# Patient Record
Sex: FEMALE | Race: WHITE | HISPANIC OR LATINO
[De-identification: names, ages, dates, MRNs, and addresses within clinical notes are randomized per-mention and may not be internally consistent; named-entity substitution may affect disease eponyms.]

---

## 2017-01-27 ENCOUNTER — APPOINTMENT (OUTPATIENT)
Dept: OTOLARYNGOLOGY | Facility: CLINIC | Age: 67
End: 2017-01-27

## 2017-01-27 VITALS
DIASTOLIC BLOOD PRESSURE: 73 MMHG | HEART RATE: 70 BPM | HEIGHT: 63 IN | BODY MASS INDEX: 27.82 KG/M2 | WEIGHT: 157 LBS | SYSTOLIC BLOOD PRESSURE: 137 MMHG

## 2017-01-27 RX ORDER — FLUTICASONE PROPIONATE 50 UG/1
50 SPRAY, METERED NASAL
Qty: 16 | Refills: 0 | Status: COMPLETED | COMMUNITY
Start: 2016-03-10

## 2017-03-10 ENCOUNTER — EMERGENCY (EMERGENCY)
Facility: HOSPITAL | Age: 67
LOS: 1 days | Discharge: HOME | End: 2017-03-10
Attending: EMERGENCY MEDICINE | Admitting: EMERGENCY MEDICINE
Payer: MEDICARE

## 2017-03-10 VITALS
RESPIRATION RATE: 18 BRPM | DIASTOLIC BLOOD PRESSURE: 73 MMHG | SYSTOLIC BLOOD PRESSURE: 164 MMHG | OXYGEN SATURATION: 98 % | HEART RATE: 77 BPM | TEMPERATURE: 98 F

## 2017-03-10 DIAGNOSIS — S05.01XA INJURY OF CONJUNCTIVA AND CORNEAL ABRASION WITHOUT FOREIGN BODY, RIGHT EYE, INITIAL ENCOUNTER: ICD-10-CM

## 2017-03-10 DIAGNOSIS — Z98.89 OTHER SPECIFIED POSTPROCEDURAL STATES: Chronic | ICD-10-CM
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
## 2017-03-10 DIAGNOSIS — J45.909 UNSPECIFIED ASTHMA, UNCOMPLICATED: ICD-10-CM

## 2017-03-10 DIAGNOSIS — S05.02XA INJURY OF CONJUNCTIVA AND CORNEAL ABRASION WITHOUT FOREIGN BODY, LEFT EYE, INITIAL ENCOUNTER: ICD-10-CM

## 2017-03-10 DIAGNOSIS — Z79.899 OTHER LONG TERM (CURRENT) DRUG THERAPY: ICD-10-CM
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
## 2017-03-10 DIAGNOSIS — E78.5 HYPERLIPIDEMIA, UNSPECIFIED: ICD-10-CM

## 2017-03-10 DIAGNOSIS — T26.61XA CORROSION OF CORNEA AND CONJUNCTIVAL SAC, RIGHT EYE, INITIAL ENCOUNTER: ICD-10-CM
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
## 2017-03-10 DIAGNOSIS — T26.62XA CORROSION OF CORNEA AND CONJUNCTIVAL SAC, LEFT EYE, INITIAL ENCOUNTER: ICD-10-CM

## 2017-03-10 DIAGNOSIS — H57.12 OCULAR PAIN, LEFT EYE: ICD-10-CM

## 2017-03-10 DIAGNOSIS — Z90.710 ACQUIRED ABSENCE OF BOTH CERVIX AND UTERUS: Chronic | ICD-10-CM

## 2017-03-10 PROCEDURE — 99284 EMERGENCY DEPT VISIT MOD MDM: CPT

## 2017-03-10 PROCEDURE — 99284 EMERGENCY DEPT VISIT MOD MDM: CPT | Mod: 25

## 2017-03-10 RX ORDER — IBUPROFEN 200 MG
1 TABLET ORAL
Qty: 30 | Refills: 0 | OUTPATIENT
Start: 2017-03-10

## 2017-03-10 RX ORDER — IBUPROFEN 200 MG
600 TABLET ORAL ONCE
Qty: 0 | Refills: 0 | Status: COMPLETED | OUTPATIENT
Start: 2017-03-10 | End: 2017-03-10

## 2017-03-10 RX ORDER — MOXIFLOXACIN HCL 0.5 %
1 DROPS OPHTHALMIC (EYE) ONCE
Qty: 0 | Refills: 0 | Status: DISCONTINUED | OUTPATIENT
Start: 2017-03-10 | End: 2017-03-10

## 2017-03-10 RX ORDER — MOXIFLOXACIN HCL 0.5 %
1 DROPS OPHTHALMIC (EYE)
Qty: 1 | Refills: 1 | OUTPATIENT
Start: 2017-03-10 | End: 2017-03-23

## 2017-03-10 RX ADMIN — Medication 600 MILLIGRAM(S): at 15:56

## 2017-03-10 RX ADMIN — Medication 600 MILLIGRAM(S): at 16:22

## 2017-03-10 NOTE — ED PROVIDER NOTE - PMH
Asthma    Breast cyst    Environmental allergies    GERD without esophagitis    Hyperlipidemia    Osteopenia    Prediabetes    Sinusitis

## 2017-03-10 NOTE — ED PROVIDER NOTE - PROGRESS NOTE DETAILS
Peter consulted. Pt discussed w Dr Horton - agrees w plan for abx drops (Vigamox tid), tetanus not necessary; to f/u w him on Monday.

## 2017-03-10 NOTE — ED PROVIDER NOTE - EYE EXAM METHOD
slit lamp/fluorescein/bilat eyes injected conjunctivae, + purulent drainage, R > L, eomi, no photophobia, no fb, bilat upper lids w erythema w/o ttp or warmth, R > L, lg corneal abrasion/fluorescein uptake R eye over almost entirety of iris area, L eye w uptake lower 1/3 of iris area

## 2017-03-10 NOTE — ED PROVIDER NOTE - OBJECTIVE STATEMENT
65 yo female h/o asthma c/o bilat eye pain, swelling and decreased vision R much greater than L after going to the gym today.  Pt used the equipment and then went to sauna then shower .  Pt denies fb sensation, fb but possibly got shampoo or conditioning products in the eye.  No trauma, no uri sx, + redness, swelling, drainage.  No recent travel, no sick contacts w similar.  No fever.

## 2017-03-10 NOTE — ED PROVIDER NOTE - CARE PLAN
Principal Discharge DX:	Corneal abrasion, unspecified laterality, initial encounter  Secondary Diagnosis:	Chemical conjunctivitis of both eyes

## 2017-03-10 NOTE — ED PROVIDER NOTE - MEDICAL DECISION MAKING DETAILS
Pt w conjunctivitis, corneal abrasion bilat and conjunctivitis, suspect 2/2 chemical from hair products and/or rubbing eyes.  Will discuss w ophtho - likely dc w pain meds, abx drops, and f/u eye.

## 2017-03-17 ENCOUNTER — APPOINTMENT (OUTPATIENT)
Dept: OPHTHALMOLOGY | Facility: CLINIC | Age: 67
End: 2017-03-17

## 2017-04-21 ENCOUNTER — APPOINTMENT (OUTPATIENT)
Dept: OTOLARYNGOLOGY | Facility: CLINIC | Age: 67
End: 2017-04-21

## 2017-04-21 VITALS
WEIGHT: 159 LBS | DIASTOLIC BLOOD PRESSURE: 66 MMHG | SYSTOLIC BLOOD PRESSURE: 130 MMHG | BODY MASS INDEX: 28.17 KG/M2 | HEIGHT: 63 IN | HEART RATE: 70 BPM

## 2017-04-21 RX ORDER — PREDNISONE 10 MG/1
10 TABLET ORAL DAILY
Qty: 18 | Refills: 0 | Status: COMPLETED | COMMUNITY
Start: 2017-01-27 | End: 2017-04-21

## 2017-05-04 ENCOUNTER — OUTPATIENT (OUTPATIENT)
Dept: OUTPATIENT SERVICES | Facility: HOSPITAL | Age: 67
LOS: 1 days | End: 2017-05-04
Payer: COMMERCIAL

## 2017-05-04 DIAGNOSIS — Z90.710 ACQUIRED ABSENCE OF BOTH CERVIX AND UTERUS: Chronic | ICD-10-CM

## 2017-05-04 DIAGNOSIS — Z98.89 OTHER SPECIFIED POSTPROCEDURAL STATES: Chronic | ICD-10-CM

## 2017-05-04 PROCEDURE — 70486 CT MAXILLOFACIAL W/O DYE: CPT

## 2017-05-04 PROCEDURE — 70486 CT MAXILLOFACIAL W/O DYE: CPT | Mod: 26

## 2017-06-07 ENCOUNTER — APPOINTMENT (OUTPATIENT)
Dept: OTOLARYNGOLOGY | Facility: CLINIC | Age: 67
End: 2017-06-07

## 2017-06-12 ENCOUNTER — APPOINTMENT (OUTPATIENT)
Dept: VASCULAR SURGERY | Facility: CLINIC | Age: 67
End: 2017-06-12

## 2017-06-30 ENCOUNTER — APPOINTMENT (OUTPATIENT)
Dept: VASCULAR SURGERY | Facility: CLINIC | Age: 67
End: 2017-06-30

## 2017-07-10 ENCOUNTER — APPOINTMENT (OUTPATIENT)
Dept: VASCULAR SURGERY | Facility: CLINIC | Age: 67
End: 2017-07-10

## 2017-07-17 VITALS
SYSTOLIC BLOOD PRESSURE: 160 MMHG | DIASTOLIC BLOOD PRESSURE: 69 MMHG | HEIGHT: 64 IN | HEART RATE: 65 BPM | WEIGHT: 160.06 LBS | OXYGEN SATURATION: 98 % | TEMPERATURE: 98 F | RESPIRATION RATE: 18 BRPM

## 2017-07-17 NOTE — ASU PATIENT PROFILE, ADULT - PMH
Asthma    Breast cyst    Environmental allergies    GERD without esophagitis    Hyperlipidemia    Prediabetes    Sinusitis

## 2017-07-17 NOTE — ASU PATIENT PROFILE, ADULT - REASON FOR ADMISSION, PROFILE
Bilateral endoscopic sinus surgery fess (bilateral) sphenoidectomy:maxillary antrostomy with removal of tissue

## 2017-08-02 ENCOUNTER — APPOINTMENT (OUTPATIENT)
Dept: OTOLARYNGOLOGY | Facility: HOSPITAL | Age: 67
End: 2017-08-02

## 2017-08-02 ENCOUNTER — OUTPATIENT (OUTPATIENT)
Dept: OUTPATIENT SERVICES | Facility: HOSPITAL | Age: 67
LOS: 1 days | Discharge: HOME | End: 2017-08-02

## 2017-08-02 DIAGNOSIS — Z98.89 OTHER SPECIFIED POSTPROCEDURAL STATES: Chronic | ICD-10-CM

## 2017-08-02 DIAGNOSIS — Z90.710 ACQUIRED ABSENCE OF BOTH CERVIX AND UTERUS: Chronic | ICD-10-CM

## 2017-09-08 RX ORDER — ALBUTEROL 90 UG/1
0 AEROSOL, METERED ORAL
Qty: 0 | Refills: 0 | COMMUNITY

## 2017-09-11 ENCOUNTER — APPOINTMENT (OUTPATIENT)
Dept: OTOLARYNGOLOGY | Facility: HOSPITAL | Age: 67
End: 2017-09-11

## 2017-09-11 ENCOUNTER — OUTPATIENT (OUTPATIENT)
Dept: OUTPATIENT SERVICES | Facility: HOSPITAL | Age: 67
LOS: 1 days | Discharge: HOME | End: 2017-09-11
Payer: MEDICARE

## 2017-09-11 ENCOUNTER — RESULT REVIEW (OUTPATIENT)
Age: 67
End: 2017-09-11

## 2017-09-11 VITALS
OXYGEN SATURATION: 98 % | DIASTOLIC BLOOD PRESSURE: 68 MMHG | TEMPERATURE: 99 F | HEART RATE: 72 BPM | SYSTOLIC BLOOD PRESSURE: 143 MMHG | RESPIRATION RATE: 14 BRPM

## 2017-09-11 DIAGNOSIS — J32.2 CHRONIC ETHMOIDAL SINUSITIS: ICD-10-CM

## 2017-09-11 DIAGNOSIS — K21.9 GASTRO-ESOPHAGEAL REFLUX DISEASE WITHOUT ESOPHAGITIS: ICD-10-CM

## 2017-09-11 DIAGNOSIS — J33.8 OTHER POLYP OF SINUS: ICD-10-CM

## 2017-09-11 DIAGNOSIS — Z90.710 ACQUIRED ABSENCE OF BOTH CERVIX AND UTERUS: Chronic | ICD-10-CM

## 2017-09-11 DIAGNOSIS — N60.09 SOLITARY CYST OF UNSPECIFIED BREAST: ICD-10-CM

## 2017-09-11 DIAGNOSIS — J32.0 CHRONIC MAXILLARY SINUSITIS: ICD-10-CM

## 2017-09-11 DIAGNOSIS — Z98.89 OTHER SPECIFIED POSTPROCEDURAL STATES: Chronic | ICD-10-CM

## 2017-09-11 DIAGNOSIS — E11.9 TYPE 2 DIABETES MELLITUS WITHOUT COMPLICATIONS: ICD-10-CM

## 2017-09-11 DIAGNOSIS — J45.909 UNSPECIFIED ASTHMA, UNCOMPLICATED: ICD-10-CM

## 2017-09-11 DIAGNOSIS — J32.3 CHRONIC SPHENOIDAL SINUSITIS: ICD-10-CM

## 2017-09-11 DIAGNOSIS — J32.1 CHRONIC FRONTAL SINUSITIS: ICD-10-CM

## 2017-09-11 PROCEDURE — 31288 NASAL/SINUS ENDOSCOPY SURG: CPT | Mod: 50,GC

## 2017-09-11 PROCEDURE — 31288 NASAL/SINUS ENDOSCOPY SURG: CPT | Mod: 50

## 2017-09-11 PROCEDURE — 31267 ENDOSCOPY MAXILLARY SINUS: CPT | Mod: 50,GC

## 2017-09-11 PROCEDURE — 88311 DECALCIFY TISSUE: CPT

## 2017-09-11 PROCEDURE — 31276 NSL/SINS NDSC FRNT TISS RMVL: CPT | Mod: 50,GC

## 2017-09-11 PROCEDURE — 31276 NSL/SINS NDSC FRNT TISS RMVL: CPT | Mod: 50

## 2017-09-11 PROCEDURE — 30110 REMOVAL OF NOSE POLYP(S): CPT | Mod: XS

## 2017-09-11 PROCEDURE — 88304 TISSUE EXAM BY PATHOLOGIST: CPT

## 2017-09-11 PROCEDURE — 31255 NSL/SINS NDSC W/TOT ETHMDCT: CPT | Mod: 50,GC

## 2017-09-11 PROCEDURE — 61782 SCAN PROC CRANIAL EXTRA: CPT | Mod: GC

## 2017-09-11 PROCEDURE — 31255 NSL/SINS NDSC W/TOT ETHMDCT: CPT | Mod: 50

## 2017-09-11 PROCEDURE — 88305 TISSUE EXAM BY PATHOLOGIST: CPT

## 2017-09-11 PROCEDURE — 30130 EXCISE INFERIOR TURBINATE: CPT | Mod: 50

## 2017-09-11 PROCEDURE — 31267 ENDOSCOPY MAXILLARY SINUS: CPT | Mod: 50

## 2017-09-11 RX ORDER — ACETAMINOPHEN 500 MG
650 TABLET ORAL EVERY 6 HOURS
Qty: 0 | Refills: 0 | Status: DISCONTINUED | OUTPATIENT
Start: 2017-09-11 | End: 2017-09-11

## 2017-09-11 RX ORDER — FLUTICASONE PROPIONATE AND SALMETEROL 50; 250 UG/1; UG/1
0 POWDER ORAL; RESPIRATORY (INHALATION)
Qty: 0 | Refills: 0 | COMMUNITY

## 2017-09-11 RX ORDER — MORPHINE SULFATE 50 MG/1
4 CAPSULE, EXTENDED RELEASE ORAL
Qty: 0 | Refills: 0 | Status: DISCONTINUED | OUTPATIENT
Start: 2017-09-11 | End: 2017-09-11

## 2017-09-11 RX ORDER — MONTELUKAST 4 MG/1
0 TABLET, CHEWABLE ORAL
Qty: 0 | Refills: 0 | COMMUNITY

## 2017-09-11 RX ORDER — ONDANSETRON 8 MG/1
4 TABLET, FILM COATED ORAL EVERY 6 HOURS
Qty: 0 | Refills: 0 | Status: DISCONTINUED | OUTPATIENT
Start: 2017-09-11 | End: 2017-09-11

## 2017-09-11 RX ORDER — SODIUM CHLORIDE 9 MG/ML
1000 INJECTION, SOLUTION INTRAVENOUS
Qty: 0 | Refills: 0 | Status: DISCONTINUED | OUTPATIENT
Start: 2017-09-11 | End: 2017-09-11

## 2017-09-11 RX ADMIN — Medication 650 MILLIGRAM(S): at 19:04

## 2017-09-11 RX ADMIN — Medication 1 TABLET(S): at 21:38

## 2017-09-11 NOTE — BRIEF OPERATIVE NOTE - PROCEDURE
Functional endoscopic sinus surgery  09/11/2017    Active  NKOHJUAN R  Maxillary antrostomy  09/11/2017    Active  NKOHJUAN R  Ethmoidectomy and frontal sinusectomy  09/11/2017    Active  KOREYOHJUAN R  Sphenoidotomy  09/11/2017    Active  NKOHLI

## 2017-09-11 NOTE — BRIEF OPERATIVE NOTE - POST-OP DX
Chronic sinusitis, unspecified location  09/11/2017  chronic sinusitis  Active  Gasper Rousseau  Other sinusitis  09/11/2017    Active  Gasper Rousseau

## 2017-09-11 NOTE — BRIEF OPERATIVE NOTE - OPERATION/FINDINGS
extensive polypoid changes of bilateral maxillary, anterior/posterior ethmoid and sphenoid sinuses  bilateral frontal outflow tracts enlarged extensive polypoid changes of bilateral maxillary, anterior/posterior ethmoid and sphenoid sinuses  bilateral frontal outflow tracts enlarged   bilateral ethmoid, maxillary, sphenoid, portion of middle turbinate contents sent for pathology  surgiflo placed in bilateral nares  bilateral merocel packing placed  merocel packing placed in bilateral nares

## 2017-09-12 ENCOUNTER — APPOINTMENT (OUTPATIENT)
Dept: OTOLARYNGOLOGY | Facility: CLINIC | Age: 67
End: 2017-09-12
Payer: MEDICARE

## 2017-09-12 VITALS
HEART RATE: 83 BPM | BODY MASS INDEX: 28.17 KG/M2 | SYSTOLIC BLOOD PRESSURE: 134 MMHG | DIASTOLIC BLOOD PRESSURE: 74 MMHG | WEIGHT: 159 LBS | HEIGHT: 63 IN

## 2017-09-12 PROCEDURE — 99024 POSTOP FOLLOW-UP VISIT: CPT

## 2017-09-12 PROCEDURE — 31231 NASAL ENDOSCOPY DX: CPT | Mod: 58

## 2017-09-14 ENCOUNTER — FORM ENCOUNTER (OUTPATIENT)
Age: 67
End: 2017-09-14

## 2017-09-15 RX ORDER — CEFUROXIME AXETIL 250 MG/1
250 TABLET ORAL
Qty: 20 | Refills: 0 | Status: COMPLETED | COMMUNITY
Start: 2017-04-21 | End: 2017-05-02

## 2017-09-15 RX ORDER — PREDNISONE 10 MG/1
10 TABLET ORAL
Qty: 1 | Refills: 0 | Status: COMPLETED | COMMUNITY
Start: 2017-04-21 | End: 2017-09-15

## 2017-09-19 LAB — SURGICAL PATHOLOGY STUDY: SIGNIFICANT CHANGE UP

## 2017-09-24 ENCOUNTER — FORM ENCOUNTER (OUTPATIENT)
Age: 67
End: 2017-09-24

## 2017-09-28 ENCOUNTER — APPOINTMENT (OUTPATIENT)
Dept: OTOLARYNGOLOGY | Facility: CLINIC | Age: 67
End: 2017-09-28
Payer: MEDICARE

## 2017-09-28 VITALS
HEIGHT: 63 IN | WEIGHT: 159 LBS | SYSTOLIC BLOOD PRESSURE: 130 MMHG | HEART RATE: 82 BPM | DIASTOLIC BLOOD PRESSURE: 57 MMHG | BODY MASS INDEX: 28.17 KG/M2

## 2017-09-28 PROCEDURE — 31237 NSL/SINS NDSC SURG BX POLYPC: CPT | Mod: RT

## 2017-09-28 PROCEDURE — 99214 OFFICE O/P EST MOD 30 MIN: CPT | Mod: 25

## 2017-11-08 ENCOUNTER — APPOINTMENT (OUTPATIENT)
Dept: OTOLARYNGOLOGY | Facility: CLINIC | Age: 67
End: 2017-11-08
Payer: MEDICARE

## 2017-11-08 VITALS
HEIGHT: 63 IN | HEART RATE: 73 BPM | WEIGHT: 167 LBS | DIASTOLIC BLOOD PRESSURE: 77 MMHG | BODY MASS INDEX: 29.59 KG/M2 | SYSTOLIC BLOOD PRESSURE: 145 MMHG

## 2017-11-08 PROCEDURE — 99214 OFFICE O/P EST MOD 30 MIN: CPT | Mod: 25

## 2017-11-08 PROCEDURE — 31231 NASAL ENDOSCOPY DX: CPT

## 2017-11-08 RX ORDER — SODIUM CHLORIDE 0.65 %
0.65 AEROSOL, SPRAY (ML) NASAL
Qty: 2 | Refills: 3 | Status: DISCONTINUED | COMMUNITY
Start: 2017-09-28 | End: 2017-11-08

## 2017-11-08 RX ORDER — SODIUM CHLORIDE 0.65 %
0.65 AEROSOL, SPRAY (ML) NASAL
Qty: 3 | Refills: 5 | Status: DISCONTINUED | COMMUNITY
Start: 2017-09-15 | End: 2017-11-08

## 2017-11-10 ENCOUNTER — APPOINTMENT (OUTPATIENT)
Dept: BREAST CENTER | Facility: CLINIC | Age: 67
End: 2017-11-10
Payer: MEDICARE

## 2017-11-10 VITALS
SYSTOLIC BLOOD PRESSURE: 136 MMHG | WEIGHT: 167 LBS | DIASTOLIC BLOOD PRESSURE: 58 MMHG | HEIGHT: 63 IN | HEART RATE: 79 BPM | BODY MASS INDEX: 29.59 KG/M2 | TEMPERATURE: 100.1 F

## 2017-11-10 DIAGNOSIS — D24.2 BENIGN NEOPLASM OF LEFT BREAST: ICD-10-CM

## 2017-11-10 PROCEDURE — 99204 OFFICE O/P NEW MOD 45 MIN: CPT

## 2017-11-15 ENCOUNTER — FORM ENCOUNTER (OUTPATIENT)
Age: 67
End: 2017-11-15

## 2017-12-11 ENCOUNTER — FORM ENCOUNTER (OUTPATIENT)
Age: 67
End: 2017-12-11

## 2017-12-13 ENCOUNTER — APPOINTMENT (OUTPATIENT)
Dept: OTOLARYNGOLOGY | Facility: CLINIC | Age: 67
End: 2017-12-13
Payer: MEDICARE

## 2017-12-13 VITALS
SYSTOLIC BLOOD PRESSURE: 130 MMHG | HEART RATE: 75 BPM | DIASTOLIC BLOOD PRESSURE: 67 MMHG | HEIGHT: 63 IN | BODY MASS INDEX: 29.77 KG/M2 | WEIGHT: 168 LBS

## 2017-12-13 DIAGNOSIS — Z86.69 PERSONAL HISTORY OF OTHER DISEASES OF THE NERVOUS SYSTEM AND SENSE ORGANS: ICD-10-CM

## 2017-12-13 PROCEDURE — 99215 OFFICE O/P EST HI 40 MIN: CPT | Mod: 25

## 2017-12-13 PROCEDURE — 31231 NASAL ENDOSCOPY DX: CPT

## 2017-12-19 ENCOUNTER — RESULT REVIEW (OUTPATIENT)
Age: 67
End: 2017-12-19

## 2017-12-19 ENCOUNTER — APPOINTMENT (OUTPATIENT)
Dept: OTOLARYNGOLOGY | Facility: CLINIC | Age: 67
End: 2017-12-19
Payer: MEDICARE

## 2017-12-19 VITALS
HEIGHT: 63 IN | DIASTOLIC BLOOD PRESSURE: 64 MMHG | HEART RATE: 74 BPM | SYSTOLIC BLOOD PRESSURE: 125 MMHG | WEIGHT: 173 LBS | BODY MASS INDEX: 30.65 KG/M2

## 2017-12-19 PROCEDURE — 31237 NSL/SINS NDSC SURG BX POLYPC: CPT | Mod: LT

## 2017-12-20 ENCOUNTER — OUTPATIENT (OUTPATIENT)
Dept: OUTPATIENT SERVICES | Facility: HOSPITAL | Age: 67
LOS: 1 days | End: 2017-12-20
Payer: MEDICARE

## 2017-12-20 DIAGNOSIS — Z98.89 OTHER SPECIFIED POSTPROCEDURAL STATES: Chronic | ICD-10-CM

## 2017-12-20 DIAGNOSIS — Z90.710 ACQUIRED ABSENCE OF BOTH CERVIX AND UTERUS: Chronic | ICD-10-CM

## 2017-12-20 DIAGNOSIS — J33.9 NASAL POLYP, UNSPECIFIED: ICD-10-CM

## 2017-12-22 LAB — SURGICAL PATHOLOGY STUDY: SIGNIFICANT CHANGE UP

## 2017-12-29 PROCEDURE — 88304 TISSUE EXAM BY PATHOLOGIST: CPT

## 2018-01-24 ENCOUNTER — APPOINTMENT (OUTPATIENT)
Dept: OTOLARYNGOLOGY | Facility: CLINIC | Age: 68
End: 2018-01-24
Payer: MEDICARE

## 2018-01-24 VITALS
SYSTOLIC BLOOD PRESSURE: 140 MMHG | WEIGHT: 158 LBS | BODY MASS INDEX: 28 KG/M2 | HEART RATE: 72 BPM | HEIGHT: 63 IN | DIASTOLIC BLOOD PRESSURE: 73 MMHG

## 2018-01-24 DIAGNOSIS — Z87.898 PERSONAL HISTORY OF OTHER SPECIFIED CONDITIONS: ICD-10-CM

## 2018-01-24 PROCEDURE — 31231 NASAL ENDOSCOPY DX: CPT

## 2018-01-24 PROCEDURE — 99214 OFFICE O/P EST MOD 30 MIN: CPT | Mod: 25

## 2018-01-26 NOTE — PRE-OP CHECKLIST - ANTIBIOTIC
Patient is going to have a cortisone injection in her shoulder and has concerns related to osteoporosis. She would like to know if Dr. Patricio Estrada thinks it is okay for the injection.  Please advise [phone]
Patient stated that she is have a great deal of shoulder pain and would like to speak with Dr. Kayla Vargas related to taking a cortisone injection and the osteoporosis [phone]
Spoke with patient, OK to have steroid injection (up to 3 times in a 12 month period is considered safe.
n/a

## 2018-01-29 ENCOUNTER — APPOINTMENT (OUTPATIENT)
Dept: VASCULAR SURGERY | Facility: CLINIC | Age: 68
End: 2018-01-29

## 2018-02-15 PROBLEM — Z87.898 HISTORY OF LUMP OF LEFT BREAST: Status: RESOLVED | Noted: 2017-11-10 | Resolved: 2018-02-15

## 2018-02-23 ENCOUNTER — APPOINTMENT (OUTPATIENT)
Dept: OTOLARYNGOLOGY | Facility: CLINIC | Age: 68
End: 2018-02-23
Payer: MEDICARE

## 2018-02-23 VITALS
BODY MASS INDEX: 28 KG/M2 | DIASTOLIC BLOOD PRESSURE: 81 MMHG | HEIGHT: 63 IN | WEIGHT: 158 LBS | HEART RATE: 71 BPM | SYSTOLIC BLOOD PRESSURE: 155 MMHG

## 2018-02-23 PROCEDURE — 99213 OFFICE O/P EST LOW 20 MIN: CPT | Mod: 25

## 2018-02-23 PROCEDURE — 31231 NASAL ENDOSCOPY DX: CPT

## 2018-02-23 RX ORDER — AZITHROMYCIN 500 MG/1
500 TABLET, FILM COATED ORAL
Qty: 3 | Refills: 0 | Status: COMPLETED | COMMUNITY
Start: 2017-11-30

## 2018-02-23 RX ORDER — PREDNISONE 50 MG/1
50 TABLET ORAL
Qty: 4 | Refills: 0 | Status: COMPLETED | COMMUNITY
Start: 2017-11-30

## 2018-02-23 RX ORDER — METHYLPREDNISOLONE 4 MG/1
4 TABLET ORAL
Qty: 1 | Refills: 0 | Status: COMPLETED | COMMUNITY
Start: 2018-01-24 | End: 2018-01-30

## 2018-02-23 RX ORDER — IBUPROFEN 600 MG/1
600 TABLET ORAL
Qty: 90 | Refills: 0 | Status: COMPLETED | COMMUNITY
Start: 2017-08-25

## 2018-02-23 RX ORDER — AMOXICILLIN AND CLAVULANATE POTASSIUM 875; 125 MG/1; MG/1
875-125 TABLET, COATED ORAL
Qty: 14 | Refills: 0 | Status: COMPLETED | COMMUNITY
Start: 2017-09-12

## 2018-05-02 ENCOUNTER — APPOINTMENT (OUTPATIENT)
Dept: OTOLARYNGOLOGY | Facility: CLINIC | Age: 68
End: 2018-05-02
Payer: MEDICARE

## 2018-05-02 VITALS
WEIGHT: 165 LBS | HEIGHT: 64 IN | BODY MASS INDEX: 28.17 KG/M2 | HEART RATE: 77 BPM | DIASTOLIC BLOOD PRESSURE: 73 MMHG | SYSTOLIC BLOOD PRESSURE: 144 MMHG

## 2018-05-02 PROCEDURE — 31231 NASAL ENDOSCOPY DX: CPT

## 2018-05-02 PROCEDURE — 99213 OFFICE O/P EST LOW 20 MIN: CPT | Mod: 25

## 2018-06-19 RX ORDER — ERGOCALCIFEROL 1.25 MG/1
1.25 MG CAPSULE, LIQUID FILLED ORAL
Qty: 6 | Refills: 0 | Status: DISCONTINUED | COMMUNITY
Start: 2017-08-25 | End: 2018-05-02

## 2018-07-06 ENCOUNTER — APPOINTMENT (OUTPATIENT)
Dept: OTOLARYNGOLOGY | Facility: CLINIC | Age: 68
End: 2018-07-06
Payer: MEDICARE

## 2018-07-06 VITALS
WEIGHT: 165 LBS | SYSTOLIC BLOOD PRESSURE: 135 MMHG | DIASTOLIC BLOOD PRESSURE: 65 MMHG | HEIGHT: 64 IN | BODY MASS INDEX: 28.17 KG/M2 | HEART RATE: 71 BPM

## 2018-07-06 DIAGNOSIS — J01.81 OTHER ACUTE RECURRENT SINUSITIS: ICD-10-CM

## 2018-07-06 PROCEDURE — 31231 NASAL ENDOSCOPY DX: CPT

## 2018-07-06 PROCEDURE — 99213 OFFICE O/P EST LOW 20 MIN: CPT | Mod: 25

## 2018-11-19 ENCOUNTER — FORM ENCOUNTER (OUTPATIENT)
Age: 68
End: 2018-11-19

## 2018-12-03 ENCOUNTER — APPOINTMENT (OUTPATIENT)
Dept: OTOLARYNGOLOGY | Facility: CLINIC | Age: 68
End: 2018-12-03
Payer: MEDICARE

## 2018-12-03 ENCOUNTER — FORM ENCOUNTER (OUTPATIENT)
Age: 68
End: 2018-12-03

## 2018-12-03 VITALS
HEIGHT: 64 IN | WEIGHT: 162 LBS | BODY MASS INDEX: 27.66 KG/M2 | HEART RATE: 83 BPM | DIASTOLIC BLOOD PRESSURE: 81 MMHG | SYSTOLIC BLOOD PRESSURE: 141 MMHG

## 2018-12-03 DIAGNOSIS — M79.604 PAIN IN RIGHT LEG: ICD-10-CM

## 2018-12-03 PROCEDURE — 31231 NASAL ENDOSCOPY DX: CPT

## 2018-12-03 PROCEDURE — 99214 OFFICE O/P EST MOD 30 MIN: CPT | Mod: 25

## 2018-12-03 RX ORDER — MOMETASONE 50 UG/1
50 SPRAY, METERED NASAL TWICE DAILY
Qty: 6 | Refills: 3 | Status: DISCONTINUED | COMMUNITY
Start: 2017-01-27 | End: 2018-12-03

## 2019-04-03 ENCOUNTER — APPOINTMENT (OUTPATIENT)
Dept: OTOLARYNGOLOGY | Facility: CLINIC | Age: 69
End: 2019-04-03
Payer: MEDICARE

## 2019-04-03 VITALS
BODY MASS INDEX: 26.29 KG/M2 | HEART RATE: 73 BPM | WEIGHT: 154 LBS | HEIGHT: 64 IN | DIASTOLIC BLOOD PRESSURE: 66 MMHG | SYSTOLIC BLOOD PRESSURE: 132 MMHG

## 2019-04-03 PROCEDURE — 31231 NASAL ENDOSCOPY DX: CPT

## 2019-04-03 PROCEDURE — 99213 OFFICE O/P EST LOW 20 MIN: CPT | Mod: 25

## 2019-04-03 RX ORDER — FLUTICASONE PROPIONATE 50 UG/1
50 SPRAY, METERED NASAL DAILY
Qty: 3 | Refills: 3 | Status: DISCONTINUED | COMMUNITY
Start: 2018-12-03 | End: 2019-04-03

## 2019-04-03 NOTE — HISTORY OF PRESENT ILLNESS
[de-identified] : Ms. PERKINS  is doing well, \par She denies postnasal drip or nasal congestion. Is able to breathe through nose.  Has not been able to smell x years.\par Allergies seem controlled with Montelukast. Uses an air purifier. \par Stopped using  fluticasone nasal spray.\par Previously had mometasone spray and felt like worked better but stopped because her symptoms improved and there was a high co-pay.\par Still uses saline spray. \par Asthma has been controlled, no longer has wheezing.  \par \par Takes Pantoprazole for reflux, controlled.  No heartburn if takes the PPI.\par \par S/p endoscopic removal of recurrent left ethmoid polyp in office on December 19, 2017.\par S/P revision bilateral endoscopic sinus surgery (frontal, maxillary, total ethmoid, sphenoid) and partial middle turbinate resection bilateral on September 11, 2017. \par

## 2019-04-03 NOTE — CONSULT LETTER
[Dear  ___] : Dear  [unfilled], [Courtesy Letter:] : I had the pleasure of seeing your patient, [unfilled], in my office today. [Consult Closing:] : Thank you very much for allowing me to participate in the care of this patient.  If you have any questions, please do not hesitate to contact me. [Sincerely,] : Sincerely, [FreeTextEntry2] : Hai Palomares M.D.\par [address]\[place] [id number]  [FreeTextEntry1] : \par \par Enclosed please find my office notes for April 3, 2019. \par \par \par \par \par \par  [FreeTextEntry3] : \par Hetal Teresa MD \par Otolaryngology, Head and Neck Surgery \par Residency site \par

## 2019-04-03 NOTE — PROCEDURE
[FreeTextEntry6] : \par Indication: chronic sinusitis\par -Verbal consent was obtained from patient prior to exam. \par - No spray applied to nose \par Nasal endoscopy was performed with flexible scope.\par Findings: \par -- Inferior turbinates mildly edematous bilateral.\par -- Septum was minimally deviated to the right .\par -- NO polyps seen in nose or sinus cavities\par -- The ethmoid, sphenoid and maxillary sinus openings are patent bilaterally. \par -- Thick clear mucus from left sphenoid sinusotomy into NPx.\par -- Middle turbinates s/p partial resection\par -- Nasopharynx without mass\par -- Adenoids were absent\par -- Eustachian orifices were clear bilateral\par \par The patient tolerated the procedure well.

## 2019-04-03 NOTE — ASSESSMENT
[FreeTextEntry1] : Ms. PERKINS had the following issues addressed today:\par \par 1.) Allergic rhinitis - controlled with montelukast\par 2.) Nasal polyps- not visible today \par 3.) Chronic sinusitis\par 4.) Reflux controlled on PPI\par 5.) Asthma - controlled\par \par Plan:\par -- Mometasone nasal spray BID new prescription\par -- continue other meds\par -- reflux precautions\par \par Return in 6 months

## 2019-04-03 NOTE — PHYSICAL EXAM
[FreeTextEntry1] : Slightly raspy voice at baseline. [Nasal Endoscopy Performed] : nasal endoscopy was performed, see procedure section for findings [de-identified] : 1+ bilateral [Normal] : no neck adenopathy

## 2019-10-09 ENCOUNTER — APPOINTMENT (OUTPATIENT)
Dept: OTOLARYNGOLOGY | Facility: CLINIC | Age: 69
End: 2019-10-09
Payer: MEDICARE

## 2019-10-09 VITALS
HEART RATE: 71 BPM | WEIGHT: 152 LBS | SYSTOLIC BLOOD PRESSURE: 157 MMHG | DIASTOLIC BLOOD PRESSURE: 83 MMHG | HEIGHT: 64 IN | BODY MASS INDEX: 25.95 KG/M2

## 2019-10-09 PROCEDURE — XXXXX: CPT

## 2019-11-21 ENCOUNTER — FORM ENCOUNTER (OUTPATIENT)
Age: 69
End: 2019-11-21

## 2019-12-02 ENCOUNTER — APPOINTMENT (OUTPATIENT)
Dept: VASCULAR SURGERY | Facility: CLINIC | Age: 69
End: 2019-12-02
Payer: MEDICARE

## 2019-12-02 PROCEDURE — 99213 OFFICE O/P EST LOW 20 MIN: CPT

## 2019-12-04 NOTE — REVIEW OF SYSTEMS
[Negative] : Heme/Lymph [Fever] : no fever [Chills] : no chills [Limb Pain] : no limb pain [Limb Swelling] : no limb swelling

## 2019-12-04 NOTE — HISTORY OF PRESENT ILLNESS
[FreeTextEntry1] : 68 yo F, patient with varicose veins of left LE and symptomatic GSV reflux, s/p L GSV RFA on 6/12/17 returns for a f/u. Patient states that she developed new veins and would like to know if she needs surgery to remove them. She denies edema, claudication.

## 2020-02-24 ENCOUNTER — FORM ENCOUNTER (OUTPATIENT)
Age: 70
End: 2020-02-24

## 2020-03-06 ENCOUNTER — FORM ENCOUNTER (OUTPATIENT)
Age: 70
End: 2020-03-06

## 2020-03-15 ENCOUNTER — FORM ENCOUNTER (OUTPATIENT)
Age: 70
End: 2020-03-15

## 2020-03-26 ENCOUNTER — FORM ENCOUNTER (OUTPATIENT)
Age: 70
End: 2020-03-26

## 2020-04-08 ENCOUNTER — FORM ENCOUNTER (OUTPATIENT)
Age: 70
End: 2020-04-08

## 2020-05-03 ENCOUNTER — FORM ENCOUNTER (OUTPATIENT)
Age: 70
End: 2020-05-03

## 2020-06-10 ENCOUNTER — APPOINTMENT (OUTPATIENT)
Dept: OTOLARYNGOLOGY | Facility: CLINIC | Age: 70
End: 2020-06-10
Payer: MEDICARE

## 2020-06-10 VITALS
HEART RATE: 73 BPM | BODY MASS INDEX: 25.71 KG/M2 | TEMPERATURE: 97.8 F | HEIGHT: 64 IN | SYSTOLIC BLOOD PRESSURE: 140 MMHG | WEIGHT: 150.6 LBS | DIASTOLIC BLOOD PRESSURE: 81 MMHG

## 2020-06-10 DIAGNOSIS — Z85.3 PERSONAL HISTORY OF MALIGNANT NEOPLASM OF BREAST: ICD-10-CM

## 2020-06-10 PROCEDURE — 31231 NASAL ENDOSCOPY DX: CPT

## 2020-06-10 PROCEDURE — 99213 OFFICE O/P EST LOW 20 MIN: CPT | Mod: 25

## 2020-06-22 ENCOUNTER — FORM ENCOUNTER (OUTPATIENT)
Age: 70
End: 2020-06-22

## 2020-07-28 ENCOUNTER — FORM ENCOUNTER (OUTPATIENT)
Age: 70
End: 2020-07-28

## 2020-08-02 PROBLEM — Z85.3 HISTORY OF BREAST CANCER: Status: RESOLVED | Noted: 2020-08-02 | Resolved: 2020-08-02

## 2020-08-02 NOTE — PHYSICAL EXAM
[FreeTextEntry1] : Slightly raspy voice at baseline. [Nasal Endoscopy Performed] : nasal endoscopy was performed, see procedure section for findings [de-identified] : 1+ bilateral [Normal] : no neck adenopathy

## 2020-08-02 NOTE — PROCEDURE
[FreeTextEntry6] : \par Indication: chronic sinusitis\par -Verbal consent was obtained from patient prior to exam. \par - No spray applied to nose \par Nasal endoscopy was performed with flexible scope.\par Findings: \par -- Inferior turbinates mildly edematous bilateral.\par -- Septum was minimally deviated to the right .\par -- No polyps seen except right  middle turbinate polypoid change\par -- The ethmoid, sphenoid and maxillary sinus openings are patent bilaterally. \par -- Very thick mucus in nose bilateral.\par -- Middle turbinates s/p partial resection\par -- Nasopharynx without mass\par -- Adenoids were absent\par -- Eustachian orifices were clear bilateral\par \par The patient tolerated the procedure well.\par

## 2020-08-02 NOTE — CONSULT LETTER
[Dear  ___] : Dear  [unfilled], [Courtesy Letter:] : I had the pleasure of seeing your patient, [unfilled], in my office today. [Consult Closing:] : Thank you very much for allowing me to participate in the care of this patient.  If you have any questions, please do not hesitate to contact me. [Sincerely,] : Sincerely, [FreeTextEntry2] : Hai Palomares M.D.\par [address]\[place] [id number]  [FreeTextEntry1] : \par \par Enclosed please find my office notes for Nathalie 10, 2020.\par \par \par \par \par  [FreeTextEntry3] : \par Hetal Teresa MD \par Otolaryngology, Head and Neck Surgery \par Residency site \par

## 2020-08-02 NOTE — ASSESSMENT
[FreeTextEntry1] : Ms. PERKINS had the following issues addressed today:\par \par 1.) Allergic rhinitis - controlled with montelukast\par 2.) Nasal polyps- not visible except for polypoid change along right middle turbinate remnant\par 3.) Chronic sinusitis - no sx but very thick mucus noted today\par 4.) Asthma - controlled\par \par Plan:\par -- Mometasone nasal spray BID\par -- continue other meds\par \par Return in 4-6 months \par

## 2020-08-02 NOTE — HISTORY OF PRESENT ILLNESS
[de-identified] : Ms. PERKINS reports that she is able to breathe through nose. \par No postnasal drip or facial pain/pressure.  No change in her longstanding anosmia.\par Allergies seem controlled with Montelukast. \par No asthma sx.\par S/p endoscopic removal of recurrent left ethmoid polyp in office on December 19, 2017.\par S/P revision bilateral endoscopic sinus surgery (frontal, maxillary, total ethmoid, sphenoid) and partial middle turbinate resection bilateral on September 11, 2017. \par \par No more itching in ears.\par S/p bilateral mastectomy for breast cancer in Fall 2019.\par \par

## 2020-10-14 ENCOUNTER — APPOINTMENT (OUTPATIENT)
Dept: OTOLARYNGOLOGY | Facility: CLINIC | Age: 70
End: 2020-10-14
Payer: MEDICARE

## 2020-10-14 VITALS
SYSTOLIC BLOOD PRESSURE: 153 MMHG | BODY MASS INDEX: 25.61 KG/M2 | WEIGHT: 150 LBS | HEIGHT: 64 IN | TEMPERATURE: 97.8 F | HEART RATE: 82 BPM | DIASTOLIC BLOOD PRESSURE: 84 MMHG

## 2020-10-14 PROCEDURE — 99213 OFFICE O/P EST LOW 20 MIN: CPT | Mod: 25

## 2020-10-14 PROCEDURE — 31231 NASAL ENDOSCOPY DX: CPT

## 2020-10-31 RX ORDER — MOMETASONE 50 UG/1
50 SPRAY, METERED NASAL TWICE DAILY
Qty: 6 | Refills: 3 | Status: DISCONTINUED | COMMUNITY
Start: 2019-04-03 | End: 2020-10-14

## 2020-10-31 NOTE — CONSULT LETTER
[Dear  ___] : Dear  [unfilled], [Courtesy Letter:] : I had the pleasure of seeing your patient, [unfilled], in my office today. [Consult Closing:] : Thank you very much for allowing me to participate in the care of this patient.  If you have any questions, please do not hesitate to contact me. [Sincerely,] : Sincerely, [FreeTextEntry2] : Hai Palomares M.D.\par [address]\[place] [id number]  [FreeTextEntry1] : \par \par \par \par  [FreeTextEntry3] : \par Hetal Teresa MD \par Otolaryngology, Head and Neck Surgery \par Residency site \par

## 2020-10-31 NOTE — PROCEDURE
[FreeTextEntry6] : \par Indication: chronic sinusitis\par -Verbal consent was obtained from patient prior to exam. \par - No spray applied to nose \par Nasal endoscopy was performed with flexible scope.\par Findings: \par -- Inferior turbinates moderately edematous bilateral.\par -- Septum was minimally deviated to the right .\par -- No babatunde polyps but has polypoid changes of bilateral  middle turbinate remnants \par -- The ethmoid, sphenoid and maxillary sinus openings are patent bilaterally but mucosa edematous\par -- Very thick mucus coming from right maxillary antrostomy\par -- Nasopharynx without mass but has mucus from nose\par -- Adenoids were absent\par -- Eustachian orifices were clear bilateral\par \par The patient tolerated the procedure well.\par

## 2020-10-31 NOTE — PHYSICAL EXAM
[Normal] : orientation to person, place, and time: normal [FreeTextEntry1] : Raspy loud voice is unchanges. [de-identified] : Slightly enlarged submandibular glands bilateral, nontender.  Thyroid gland normal size, no palpable nodules.  [Nasal Endoscopy Performed] : nasal endoscopy was performed, see procedure section for findings [de-identified] : 1+ bilateral [de-identified] : Bilateral parotid glands are normal. [de-identified] : Carotid pulses 2+ bilateral.

## 2020-10-31 NOTE — ASSESSMENT
[FreeTextEntry1] : Ms. PERKINS has worsening nasal congestion and postnasal drip x past 4 months due to Allergic rhinitis and chronic sinusitis.\par Allergies usually controlled with montelukast\par No taking any nasal steroid spray and had stopped doing irrigation.\par Polypoid change along right middle turbinate remnant.\par Very thick mucus noted streaming from right maxillary sinus today.\par Asthma worse with the postnasal drip also\par \par Neck swelling is enlarged submandibular glands, which she has had before.\par There is no acute infection.\par \par Plan:\par -- Restart Mometasone nasal spray BID\par -- Restart daily nasal saline irrigations\par -- Hydration and sialogogues to get saliva flowing\par \par Return in 6 months \par

## 2020-10-31 NOTE — HISTORY OF PRESENT ILLNESS
[de-identified] : Ms. Joseph is feeling like she has a cold x past 4 months.\par \par Symptoms include post nasal drip, phlegm clear, occurring all day and at night\par Denies facial pressure. rhinitis, throat pain, ear pain or popping.  Still unable to smell.  Taste is fine.\par Not using saline rinses or steroid nasal spray\par Asthma has worsened since she got cold, had 2 nebulizer treatments at her Pulmonologist\par S/p endoscopic removal of recurrent left ethmoid polyp in office on December 19, 2017.\par S/P revision bilateral endoscopic sinus surgery (frontal, maxillary, total ethmoid, sphenoid) and partial middle turbinate resection bilateral on September 11, 2017. \par \par She is taking Motrin when her glands are swollen which improves the swelling\par Reflux has been okay\par

## 2020-11-12 ENCOUNTER — FORM ENCOUNTER (OUTPATIENT)
Age: 70
End: 2020-11-12

## 2020-11-12 NOTE — ED PROVIDER NOTE - DISPOSITION TYPE
DISCHARGE Modified Advancement Flap Text: The defect edges were debeveled with a #15 scalpel blade.  Given the location of the defect, shape of the defect and the proximity to free margins a modified advancement flap was deemed most appropriate.  Using a sterile surgical marker, an appropriate advancement flap was drawn incorporating the defect and placing the expected incisions within the relaxed skin tension lines where possible.    The area thus outlined was incised deep to adipose tissue with a #15 scalpel blade.  The skin margins were undermined to an appropriate distance in all directions utilizing iris scissors.

## 2021-01-04 ENCOUNTER — APPOINTMENT (OUTPATIENT)
Dept: OTOLARYNGOLOGY | Facility: CLINIC | Age: 71
End: 2021-01-04
Payer: MEDICARE

## 2021-01-04 VITALS
TEMPERATURE: 96.4 F | HEART RATE: 88 BPM | HEIGHT: 64 IN | DIASTOLIC BLOOD PRESSURE: 76 MMHG | SYSTOLIC BLOOD PRESSURE: 162 MMHG | BODY MASS INDEX: 25.27 KG/M2 | WEIGHT: 148 LBS

## 2021-01-04 DIAGNOSIS — J32.8 OTHER CHRONIC SINUSITIS: ICD-10-CM

## 2021-01-04 DIAGNOSIS — K11.20 SIALOADENITIS, UNSPECIFIED: ICD-10-CM

## 2021-01-04 DIAGNOSIS — Z87.898 PERSONAL HISTORY OF OTHER SPECIFIED CONDITIONS: ICD-10-CM

## 2021-01-04 DIAGNOSIS — L29.9 PRURITUS, UNSPECIFIED: ICD-10-CM

## 2021-01-04 PROCEDURE — 99214 OFFICE O/P EST MOD 30 MIN: CPT | Mod: 25

## 2021-01-04 PROCEDURE — 31231 NASAL ENDOSCOPY DX: CPT

## 2021-01-04 PROCEDURE — 99072 ADDL SUPL MATRL&STAF TM PHE: CPT

## 2021-01-04 RX ORDER — CEPHALEXIN 250 MG/1
250 CAPSULE ORAL
Qty: 40 | Refills: 0 | Status: COMPLETED | COMMUNITY
Start: 2020-07-21

## 2021-01-04 RX ORDER — METRONIDAZOLE 250 MG/1
250 TABLET ORAL
Qty: 40 | Refills: 0 | Status: COMPLETED | COMMUNITY
Start: 2020-10-01

## 2021-01-04 RX ORDER — AMOXICILLIN 500 MG/1
500 CAPSULE ORAL
Qty: 56 | Refills: 0 | Status: COMPLETED | COMMUNITY
Start: 2020-07-17

## 2021-01-04 RX ORDER — FAMOTIDINE 20 MG/1
20 TABLET, FILM COATED ORAL
Qty: 180 | Refills: 0 | Status: COMPLETED | COMMUNITY
Start: 2020-10-09

## 2021-01-04 RX ORDER — PROMETHAZINE HYDROCHLORIDE 6.25 MG/5ML
6.25 SOLUTION ORAL
Qty: 120 | Refills: 0 | Status: COMPLETED | COMMUNITY
Start: 2020-12-15

## 2021-01-04 RX ORDER — ATORVASTATIN CALCIUM 40 MG/1
40 TABLET, FILM COATED ORAL
Qty: 90 | Refills: 0 | Status: ACTIVE | COMMUNITY
Start: 2020-12-14

## 2021-01-04 RX ORDER — TRIAMCINOLONE ACETONIDE 1 MG/G
0.1 OINTMENT TOPICAL
Qty: 454 | Refills: 0 | Status: COMPLETED | COMMUNITY
Start: 2020-08-19

## 2021-01-04 RX ORDER — TETRACYCLINE HYDROCHLORIDE 500 MG/1
500 CAPSULE ORAL
Qty: 40 | Refills: 0 | Status: COMPLETED | COMMUNITY
Start: 2020-10-01

## 2021-01-04 RX ORDER — POLYETHYLENE GLYCOL-3350 AND ELECTROLYTES WITH FLAVOR PACK 240; 5.84; 2.98; 6.72; 22.72 G/278.26G; G/278.26G; G/278.26G; G/278.26G; G/278.26G
240 POWDER, FOR SOLUTION ORAL
Qty: 4000 | Refills: 0 | Status: COMPLETED | COMMUNITY
Start: 2020-07-06

## 2021-01-04 RX ORDER — OXYCODONE AND ACETAMINOPHEN 5; 325 MG/1; MG/1
5-325 TABLET ORAL
Qty: 30 | Refills: 0 | Status: COMPLETED | COMMUNITY
Start: 2020-07-21

## 2021-01-04 RX ORDER — CLARITHROMYCIN 500 MG/1
500 TABLET, FILM COATED ORAL
Qty: 28 | Refills: 0 | Status: COMPLETED | COMMUNITY
Start: 2020-07-17

## 2021-01-05 NOTE — ASSESSMENT
[FreeTextEntry1] : Ms. PERKINS has persistent but worsening productive/dry cough x past 6 months.  \par She has over 1 month of sore throat, not relieved with Chloraseptic lozenges.\par She is taking mometasone nasal spray and doing saline irrigations.  Does feel postnasal drip.\par Today, I see small polyps in right middle meatus and thick yellowish mucus streaming from right maxillary antrostomy and middle meatus; left sinus cavities are open, no drainage.\par Polypoid changes of the middle turbinate remnants still present.\par Asthma has been OK\par Submandibular glands seem normal in size and quality today\par Reflux chronic can also contribute to the cough\par \par Plan:\par -- continue Mometasone nasal spray BID and daily nasal saline irrigations\par -- oral steroid taper\par -- oral steroids for sinusitis\par -- continue PPI and reflux precautions\par \par Return in 1 month\par

## 2021-01-05 NOTE — CONSULT LETTER
[Dear  ___] : Dear  [unfilled], [Courtesy Letter:] : I had the pleasure of seeing your patient, [unfilled], in my office today. [Consult Closing:] : Thank you very much for allowing me to participate in the care of this patient.  If you have any questions, please do not hesitate to contact me. [Sincerely,] : Sincerely, [Please see my note below.] : Please see my note below. [FreeTextEntry2] : Hai Palomares M.D.\par [address]\[place] [phone]  [FreeTextEntry1] : \par \par \par \par  [FreeTextEntry3] : \par Hetal Teresa MD \par Otolaryngology, Head and Neck Surgery \par Residency site \par

## 2021-01-05 NOTE — PHYSICAL EXAM
[Nasal Endoscopy Performed] : nasal endoscopy was performed, see procedure section for findings [Laryngoscopy Performed] : laryngoscopy was performed, see procedure section for findings [FreeTextEntry1] : Raspy loud voice is unchanged. [de-identified] : Thyroid gland normal size, no palpable nodules.  [de-identified] : 1+ bilateral [de-identified] : Marked erythema and mild edema of posterior pharyngeal wall  [Normal] : salivary glands are normal

## 2021-01-05 NOTE — HISTORY OF PRESENT ILLNESS
[de-identified] : Ms. Joseph c/o cough x past 6 months, getting worse.\par Cough is sometimes dry, sometimes productive (clear or white mucus) that often wakes her from sleep.\par Also has very sore throat x past 2 weeks; Chloraseptic lozenges not really helping her.\par She denies post nasal drip, facial pressure. runny nose or ear popping.  \par Unable to smell x years (pre-sinus surgery).  Taste is fine.\par Reports that she is using saline rinses or steroid nasal spray\par Asthma and breathing have been OK for past 2 months.  No SOB with exertion.\par She denies heartburn as long as she takes pantoprazole.  She also reported that a different acid medication (doesn’t remember name) has been prescribed but was very expensive, so she did not take it.\par \par S/p endoscopic removal of recurrent left ethmoid polyp in office on December 19, 2017.\par S/P revision bilateral endoscopic sinus surgery (frontal, maxillary, total ethmoid, sphenoid) and partial middle turbinate resection bilateral on September 11, 2017. \par \par

## 2021-01-05 NOTE — PROCEDURE
[FreeTextEntry6] : \par Indication: chronic sinusitis\par -Verbal consent was obtained from patient prior to exam. \par - Ron-Synephrine  spray applied to nose \par Nasal endoscopy was performed with flexible scope.\par Findings: \par -- Inferior turbinates mildly edematous bilateral.\par -- Septum was minimally deviated to the right .\par -- Small polyps in right middle meatus, partially obstructing maxillary antrostomy and anterior ethmoid cavity; yellow/clear thick mucus streaming from MM to NPs.\par No polyps seen on left side but MT remnant has polypoid changes \par -- Nasopharynx without mass but has mucus from nose\par -- Adenoids were absent\par -- Eustachian orifices were clear bilateral\par \par The patient tolerated the procedure well.\par  [de-identified] : \par Indication:  Reflux\par -Verbal consent was obtained from patient prior to procedure.\par -Ron-Synephrine  spray applied to the nasal cavities.\par Flexible laryngoscopy was performed via right  nostril and revealed the following:\par   -- Nasopharynx had no mass.  Thick mucus from right nasal cavity\par   -- Base of tongue was symmetric and not enlarged.\par   -- Vallecula was clear\par   -- Epiglottis, both aryepiglottic folds and both false vocal folds were normal\par   -- Arytenoids both with moderate edema and mild erythema \par   -- True vocal folds were fully mobile and without lesions. \par   -- Post cricoid area was clear.\par   -- Interarytenoid edema was present.\par   -- No lesions in laryngopharynx\par \par The patient tolerated the procedure well.\par

## 2021-01-08 ENCOUNTER — APPOINTMENT (OUTPATIENT)
Dept: OBGYN | Facility: CLINIC | Age: 71
End: 2021-01-08
Payer: MEDICARE

## 2021-01-08 VITALS
HEIGHT: 64 IN | DIASTOLIC BLOOD PRESSURE: 80 MMHG | WEIGHT: 153 LBS | SYSTOLIC BLOOD PRESSURE: 140 MMHG | BODY MASS INDEX: 26.12 KG/M2

## 2021-01-08 DIAGNOSIS — Z90.710 ACQUIRED ABSENCE OF BOTH CERVIX AND UTERUS: ICD-10-CM

## 2021-01-08 DIAGNOSIS — Z00.00 ENCOUNTER FOR GENERAL ADULT MEDICAL EXAMINATION W/OUT ABNORMAL FINDINGS: ICD-10-CM

## 2021-01-08 PROCEDURE — 99387 INIT PM E/M NEW PAT 65+ YRS: CPT | Mod: GY

## 2021-01-08 PROCEDURE — G0101: CPT

## 2021-01-08 PROCEDURE — 99072 ADDL SUPL MATRL&STAF TM PHE: CPT

## 2021-01-13 LAB — CYTOLOGY CVX/VAG DOC THIN PREP: ABNORMAL

## 2021-02-05 ENCOUNTER — APPOINTMENT (OUTPATIENT)
Dept: OTOLARYNGOLOGY | Facility: CLINIC | Age: 71
End: 2021-02-05
Payer: MEDICARE

## 2021-02-05 VITALS
TEMPERATURE: 96.8 F | DIASTOLIC BLOOD PRESSURE: 83 MMHG | HEART RATE: 83 BPM | SYSTOLIC BLOOD PRESSURE: 142 MMHG | WEIGHT: 150.8 LBS | BODY MASS INDEX: 25.74 KG/M2 | HEIGHT: 64 IN

## 2021-02-05 DIAGNOSIS — Z01.419 ENCOUNTER FOR GYNECOLOGICAL EXAMINATION (GENERAL) (ROUTINE) W/OUT ABNORMAL FINDINGS: ICD-10-CM

## 2021-02-05 PROCEDURE — 99214 OFFICE O/P EST MOD 30 MIN: CPT | Mod: 25

## 2021-02-05 PROCEDURE — 99072 ADDL SUPL MATRL&STAF TM PHE: CPT

## 2021-02-05 PROCEDURE — 31231 NASAL ENDOSCOPY DX: CPT

## 2021-02-05 RX ORDER — KETOCONAZOLE 20.5 MG/ML
2 SHAMPOO, SUSPENSION TOPICAL
Qty: 120 | Refills: 0 | Status: COMPLETED | COMMUNITY
Start: 2020-11-11 | End: 2021-02-05

## 2021-02-05 RX ORDER — DICLOFENAC SODIUM 1% 10 MG/G
1 GEL TOPICAL
Qty: 100 | Refills: 0 | Status: COMPLETED | COMMUNITY
Start: 2020-07-23 | End: 2021-02-05

## 2021-02-05 RX ORDER — AMOXICILLIN AND CLAVULANATE POTASSIUM 875; 125 MG/1; MG/1
875-125 TABLET, COATED ORAL
Qty: 20 | Refills: 0 | Status: COMPLETED | COMMUNITY
Start: 2021-01-04 | End: 2021-02-05

## 2021-02-05 RX ORDER — MOMETASONE FUROATE 1 MG/ML
0.1 SOLUTION TOPICAL
Qty: 60 | Refills: 0 | Status: COMPLETED | COMMUNITY
Start: 2020-11-11 | End: 2021-02-05

## 2021-02-05 RX ORDER — PREDNISONE 10 MG/1
10 TABLET ORAL DAILY
Qty: 18 | Refills: 0 | Status: COMPLETED | COMMUNITY
Start: 2021-01-04 | End: 2021-02-05

## 2021-02-23 PROBLEM — Z01.419 WELL WOMAN EXAM WITH ROUTINE GYNECOLOGICAL EXAM: Status: RESOLVED | Noted: 2021-01-08 | Resolved: 2021-02-23

## 2021-02-23 NOTE — ASSESSMENT
[FreeTextEntry1] : Ms. PERKINS had relief of the 6 months of persistent productive cough after treatment with antibiotics and oral steroids for sinusitis exacerbation in Jan 2021. \par Cough returned yesterday, and nasal endoscopy today shows very thick mucus draining from sinuses (left max, right ethmoid, right sphenoid) but no babatunde polyps.\par She is taking mometasone nasal spray but not doing saline irrigations. \par Asthma has been OK\par Reflux seems controlled\par \par Plan:\par -- continue Mometasone nasal spray BID and restart daily nasal saline irrigations\par -- continue Singulair\par -- continue PPI and reflux precautions\par \par Return in early April, before she goes back to  for undetermined long stay.\par

## 2021-02-23 NOTE — CONSULT LETTER
[Dear  ___] : Dear  [unfilled], [Courtesy Letter:] : I had the pleasure of seeing your patient, [unfilled], in my office today. [Please see my note below.] : Please see my note below. [Consult Closing:] : Thank you very much for allowing me to participate in the care of this patient.  If you have any questions, please do not hesitate to contact me. [Sincerely,] : Sincerely, [FreeTextEntry2] : Hai Palomares M.D.\par [address]\[place] [id number]  [FreeTextEntry1] : \par \par \par \par  [FreeTextEntry3] : \par Hetal Teresa MD \par Otolaryngology, Head and Neck Surgery \par Residency site \par

## 2021-02-23 NOTE — PROCEDURE
[FreeTextEntry6] : \par Indication: chronic sinusitis\par -Verbal consent was obtained from patient prior to exam. \par - Ron-Synephrine spray applied to nose bilaterally.\par Nasal endoscopy was performed with 0-degree rigid  scope.\par Findings: \par -- Inferior turbinates mild edema bilateral.  Inferior meatus clear bilateral.\par -- Septum was midline    \par -- No babatunde polyps seen in either side nasal cavities\par -- Mucus thick in left maxillary sinus, right ethmoid cavity and in right sphenoid sinus\par -- Polypoid changes of the middle turbinate remnants \par -- Middle meatus clear bilateral.  SER clear bilateral.\par -- Nasopharynx without mass or exudate\par -- Adenoids were absent      \par -- Eustachian orifices were clear bilateral\par -- left polypoid in left maxillary ansotromy\par \par \par The patient tolerated the procedure well.\par

## 2021-02-23 NOTE — PHYSICAL EXAM
[Midline] : trachea located in midline position [FreeTextEntry1] : Raspy voice as baseline. [de-identified] : EACs clear; dry flaky skin. [Nasal Endoscopy Performed] : nasal endoscopy was performed, see procedure section for findings [Normal] : palpation of lymph nodes is normal

## 2021-02-23 NOTE — HISTORY OF PRESENT ILLNESS
[de-identified] : Ms. Joseph finished antibiotics and steroids last month after prescribed for cough due to exacerbation chronic sinusitis and increased nasal polyps.  She felt much better, coughing stopped and pain stopped.\par Still using nasal spray.  Not using irrigations lately.\par Yesterday, her cough came back for the first time since above treatment. She still feels phlegm in her throat. \par Still has ear itching.\par Asthma and breathing have been OK for past 2 months. No SOB with exertion.  Allergic rhinitis.\par Denies reflux.  On pantoprazole.\par Hx of vocal cord polyps and chronic hoarseness.\par \par S/p endoscopic removal of recurrent left ethmoid polyp in office on December 19, 2017.\par S/P revision bilateral endoscopic sinus surgery (frontal, maxillary, total ethmoid, sphenoid) and partial middle turbinate resection bilateral on September 11, 2017. \par

## 2021-03-24 ENCOUNTER — APPOINTMENT (OUTPATIENT)
Dept: OTOLARYNGOLOGY | Facility: CLINIC | Age: 71
End: 2021-03-24
Payer: MEDICARE

## 2021-03-24 VITALS
WEIGHT: 150 LBS | SYSTOLIC BLOOD PRESSURE: 130 MMHG | BODY MASS INDEX: 25.61 KG/M2 | DIASTOLIC BLOOD PRESSURE: 76 MMHG | TEMPERATURE: 97.2 F | HEIGHT: 64 IN | HEART RATE: 95 BPM

## 2021-03-24 PROCEDURE — 99214 OFFICE O/P EST MOD 30 MIN: CPT | Mod: 25

## 2021-03-24 PROCEDURE — 31231 NASAL ENDOSCOPY DX: CPT

## 2021-03-24 PROCEDURE — 99072 ADDL SUPL MATRL&STAF TM PHE: CPT

## 2021-03-24 NOTE — HISTORY OF PRESENT ILLNESS
[FreeTextEntry1] : PARTH PERKINS IS A 70 year OLD WHO PRESENTS FOR AN ANNUAL GYN EXAM.  SHE IS WITHOUT COMPLAINTS.\par

## 2021-03-24 NOTE — PHYSICAL EXAM
[Appropriately responsive] : appropriately responsive [Alert] : alert [No Acute Distress] : no acute distress [No Lymphadenopathy] : no lymphadenopathy [Regular Rate Rhythm] : regular rate rhythm [No Murmurs] : no murmurs [Clear to Auscultation B/L] : clear to auscultation bilaterally [Soft] : soft [Non-tender] : non-tender [Non-distended] : non-distended [No HSM] : No HSM [No Lesions] : no lesions [No Mass] : no mass [Oriented x3] : oriented x3 [Right Breast Absent] : a total mastectomy [Breast Reconstruction Right] : breast reconstruction [Left Breast Absent] : a total mastectomy [Breast Reconstruction Left] : breast reconstruction [Labia Majora] : normal [Labia Minora] : normal [Normal] : normal [Atrophy] : atrophy [Absent] : absent [Uterine Adnexae] : normal [FreeTextEntry6] : surgically absent BL

## 2021-03-29 NOTE — PROCEDURE
[FreeTextEntry6] : \par Indication: chronic sinusitis\par -Verbal consent was obtained from patient prior to exam. \par - Ron-Synephrine spray applied to nose bilaterally.\par Nasal endoscopy was performed with 0-degree rigid  scope.\par Findings: \par -- Inferior turbinates mild edema bilateral.  Inferior meatus clear bilateral.\par -- Septum was midline    \par -- Polypoid change roof left ethmoid.\par -- Mucus thick and draining from bilateral maxillary antrostomies.  Green crusting removed from right ethmoid cavity. \par -- Middle meatus clear bilateral.  SER clear bilateral.\par -- Nasopharynx without mass or exudate\par -- Adenoids were absent      \par -- Eustachian orifices were clear bilateral\par \par \par The patient tolerated the procedure well.\par   [de-identified] : \par Indication:  reflux\par -Verbal consent was obtained from patient prior to procedure.\par Flexible laryngoscopy was performed via right nostril and revealed the following:\par   -- Nasopharynx had no mass or exudate.\par   -- Base of tongue was symmetric and not enlarged.\par   -- Vallecula was clear.  Cobblestoning posterior pharyngeal wall. \par   -- Epiglottis, both aryepiglottic folds and both false vocal folds were normal\par   -- Arytenoids both with moderate edema and no erythema \par   -- True vocal folds were fully mobile and without lesions. \par   -- Post cricoid area was clear.\par   -- Interarytenoid edema was  present.\par   -- No lesions in laryngopharynx\par \par The patient tolerated the procedure well.\par

## 2021-03-29 NOTE — PHYSICAL EXAM
[Nasal Endoscopy Performed] : nasal endoscopy was performed, see procedure section for findings [Laryngoscopy Performed] : laryngoscopy was performed, see procedure section for findings [Normal] : no neck adenopathy [FreeTextEntry1] : Husky voice as baseline. [de-identified] : Thyroid gland normal size, no palpable nodules.  [de-identified] : 1+ bilateral

## 2021-03-29 NOTE — ASSESSMENT
[FreeTextEntry1] : Ms. PERKINS has return of cough and phlegm in throat x almost past 2 months now.\par Had relief of similar sx after treatment with antibiotics and oral steroids for sinusitis exacerbation in Jan 2021. \par She is taking mometasone nasal spray and Singulair\par Asthma has been OK\par Reflux seems controlled\par \par Plan:\par -- Medrol Dosepak\par -- antibiotics\par -- continue Mometasone nasal spray BID and restart daily nasal saline irrigations\par -- continue Singulair\par -- continue PPI and reflux precautions\par \par Return to see me in 3 months, after return from \par

## 2021-03-29 NOTE — CONSULT LETTER
[Dear  ___] : Dear  [unfilled], [Courtesy Letter:] : I had the pleasure of seeing your patient, [unfilled], in my office today. [Please see my note below.] : Please see my note below. [Consult Closing:] : Thank you very much for allowing me to participate in the care of this patient.  If you have any questions, please do not hesitate to contact me. [Sincerely,] : Sincerely, [FreeTextEntry2] : Hai Palomares M.D.\par [address]\[place] [address]  [FreeTextEntry1] : \par \par \par \par  [FreeTextEntry3] : \par Hetal Teresa MD \par Otolaryngology, Head and Neck Surgery \par Residency site \par

## 2021-03-29 NOTE — HISTORY OF PRESENT ILLNESS
[de-identified] : Ms. Joseph c/o persistent cough and phlegm, now all day and night, since early Feb 2021.\par Able to breathe through nose.  Not sure if has postnasal drip.  Asthma has been OK.\par When seen in early Feb 2021, noted to have thick mucus from sinuses but no acute infection.\par Treated in Jan 2021 with antibiotics and oral steroids for 6 months of persistent productive cough due to sinusitis exacerbation --> relief.\par On mometasone spray and Singulair.\par Going to DR on 4/05 x 2 months\par On pantoprazole for reflux and has no heartburn\par Hx of vocal cord polyps and chronic hoarseness.\par \par S/p endoscopic removal of recurrent left ethmoid polyp in office on December 19, 2017.\par S/P revision bilateral endoscopic sinus surgery (frontal, maxillary, total ethmoid, sphenoid) and partial middle turbinate resection bilateral on September 11, 2017. \par

## 2021-04-23 ENCOUNTER — APPOINTMENT (OUTPATIENT)
Dept: BREAST CENTER | Facility: CLINIC | Age: 71
End: 2021-04-23

## 2021-06-09 ENCOUNTER — APPOINTMENT (OUTPATIENT)
Dept: BREAST CENTER | Facility: CLINIC | Age: 71
End: 2021-06-09

## 2021-06-24 NOTE — BRIEF OPERATIVE NOTE - ASSISTANT(S)
Gasper Rousseau MD
contact guard
PHYSICAL EXAM:  Vital Signs Last 24 Hrs  T(C): 37.1 (12-21-20 @ 00:07)  T(F): 98.7 (12-21-20 @ 00:07), Max: 100.3 (12-20-20 @ 21:01)  HR: 128 (12-21-20 @ 00:07) (116 - 130)  BP: 142/71 (12-21-20 @ 00:07)  BP(mean): --  RR: 18 (12-21-20 @ 00:07) (17 - 18)  SpO2: 100% (12-21-20 @ 00:07) (97% - 100%)  Wt(kg): --    Constitutional: NAD, awake and alert, well developed  EYES: EOMI, conjunctiva clear  ENT:  Normal Hearing, no tonsillar exudates   Neck: Soft and supple , no thyromegaly   Respiratory: Breath sounds are clear bilaterally, No wheezing, rales or rhonchi, no tachypnea, no accessory muscle use  Cardiovascular: S1 and S2, tachycardic, no Murmurs, gallops or rubs, no JVD, no leg edema  Gastrointestinal: Bowel Sounds present, soft, nontender, nondistended, no guarding, no rebound  Extremities: No cyanosis or clubbing; warm to touch  Vascular: 2+ peripheral pulses lower ex  Neurological: No focal deficits, CN II-XII intact bilaterally, sensation to light touch intact in all extremities. Pupils are equally reactive to light and symmetrical in size.   Musculoskeletal: 5/5 strength b/l upper and lower extremities; no joint swelling.  Skin: Erythema to right chest port site, +TTP, no drainage, no fluctuance, no ulcerations

## 2021-06-25 ENCOUNTER — APPOINTMENT (OUTPATIENT)
Dept: OTOLARYNGOLOGY | Facility: CLINIC | Age: 71
End: 2021-06-25
Payer: MEDICARE

## 2021-06-25 VITALS
WEIGHT: 150 LBS | DIASTOLIC BLOOD PRESSURE: 79 MMHG | HEIGHT: 64 IN | SYSTOLIC BLOOD PRESSURE: 143 MMHG | TEMPERATURE: 97 F | BODY MASS INDEX: 25.61 KG/M2 | HEART RATE: 75 BPM

## 2021-06-25 PROCEDURE — 31231 NASAL ENDOSCOPY DX: CPT

## 2021-06-25 PROCEDURE — 99072 ADDL SUPL MATRL&STAF TM PHE: CPT

## 2021-06-25 PROCEDURE — 99213 OFFICE O/P EST LOW 20 MIN: CPT | Mod: 25

## 2021-06-29 RX ORDER — METHYLPREDNISOLONE 4 MG/1
4 TABLET ORAL
Qty: 1 | Refills: 0 | Status: COMPLETED | COMMUNITY
Start: 2021-03-24 | End: 2021-03-31

## 2021-06-29 RX ORDER — CEPHALEXIN 500 MG/1
500 CAPSULE ORAL 3 TIMES DAILY
Qty: 30 | Refills: 0 | Status: COMPLETED | COMMUNITY
Start: 2021-03-24 | End: 2021-04-03

## 2021-06-29 NOTE — HISTORY OF PRESENT ILLNESS
[de-identified] : Ms. Joseph still has phlegm and cough, due to asthma.  Able to breathe through nose.  No postnasal drip, rhinorrhea.\par Treated in Jan 2021 with antibiotics and oral steroids for 6 months of persistent productive cough due to sinusitis exacerbation --> relief.  Treated in 3/2021 with cephalexin and Medrol Dosepak for return cough/phlegm.\par On mometasone spray and Singulair.\par Went to  on in April x 2 months - couldn’t stay by the pool because had trouble breathing, thought to be from cleaning chemicals.  She had to take albuterol 4 times in a row.\par S/p face/neck/eyebrow lift in  1 month ago.\par S/p endoscopic removal of recurrent left ethmoid polyp in office on December 19, 2017.\par S/P revision bilateral endoscopic sinus surgery (frontal, maxillary, total ethmoid, sphenoid) and partial middle turbinate resection bilateral on September 11, 2017. \par \par On pantoprazole for reflux and has no heartburn\par Hx of vocal cord polyps and chronic hoarseness.\par \par \par

## 2021-06-29 NOTE — CONSULT LETTER
[Dear  ___] : Dear  [unfilled], [Courtesy Letter:] : I had the pleasure of seeing your patient, [unfilled], in my office today. [Please see my note below.] : Please see my note below. [Sincerely,] : Sincerely, [Consult Closing:] : Thank you very much for allowing me to participate in the care of this patient.  If you have any questions, please do not hesitate to contact me. [FreeTextEntry2] : Hai Palomares M.D.\par [address]\[place] [address]  [FreeTextEntry1] : \par \par \par \par  [FreeTextEntry3] : \par Hetal Teresa MD \par Otolaryngology, Head and Neck Surgery \par Residency site \par

## 2021-06-29 NOTE — PHYSICAL EXAM
[Nasal Endoscopy Performed] : nasal endoscopy was performed, see procedure section for findings [Normal] : no neck adenopathy [FreeTextEntry1] : Husky voice as baseline. [de-identified] : Thyroid gland normal size, no palpable nodules.  [de-identified] : 1+ bilateral [FreeTextEntry2] : Face, neck and brows healing after surgical lifts.  She looks refreshed.

## 2021-06-29 NOTE — ASSESSMENT
[FreeTextEntry1] : Ms. PERKINS reports that cough and phlegm are due to asthma.\par Nasal breathing is good.  Sinus cavities are clear except for a small polyp on medial aspect of right middle turbinate remnant.\par \par PLAN: \par - continue current medications\par \par Return in 6 months

## 2021-07-18 ENCOUNTER — EMERGENCY (EMERGENCY)
Facility: HOSPITAL | Age: 71
LOS: 1 days | Discharge: HOME | End: 2021-07-18
Admitting: EMERGENCY MEDICINE
Payer: MEDICARE

## 2021-07-18 VITALS
SYSTOLIC BLOOD PRESSURE: 139 MMHG | HEART RATE: 73 BPM | TEMPERATURE: 98 F | HEIGHT: 64 IN | WEIGHT: 145.95 LBS | OXYGEN SATURATION: 97 % | RESPIRATION RATE: 16 BRPM | DIASTOLIC BLOOD PRESSURE: 68 MMHG

## 2021-07-18 DIAGNOSIS — H57.13 OCULAR PAIN, BILATERAL: ICD-10-CM

## 2021-07-18 DIAGNOSIS — Z98.89 OTHER SPECIFIED POSTPROCEDURAL STATES: Chronic | ICD-10-CM

## 2021-07-18 DIAGNOSIS — J45.909 UNSPECIFIED ASTHMA, UNCOMPLICATED: ICD-10-CM

## 2021-07-18 DIAGNOSIS — E78.5 HYPERLIPIDEMIA, UNSPECIFIED: ICD-10-CM

## 2021-07-18 DIAGNOSIS — R73.03 PREDIABETES: ICD-10-CM

## 2021-07-18 DIAGNOSIS — Z98.890 OTHER SPECIFIED POSTPROCEDURAL STATES: ICD-10-CM

## 2021-07-18 DIAGNOSIS — Z87.19 PERSONAL HISTORY OF OTHER DISEASES OF THE DIGESTIVE SYSTEM: ICD-10-CM

## 2021-07-18 DIAGNOSIS — Z90.710 ACQUIRED ABSENCE OF BOTH CERVIX AND UTERUS: Chronic | ICD-10-CM

## 2021-07-18 DIAGNOSIS — Z87.09 PERSONAL HISTORY OF OTHER DISEASES OF THE RESPIRATORY SYSTEM: ICD-10-CM

## 2021-07-18 DIAGNOSIS — C50.919 MALIGNANT NEOPLASM OF UNSPECIFIED SITE OF UNSPECIFIED FEMALE BREAST: ICD-10-CM

## 2021-07-18 DIAGNOSIS — Z90.710 ACQUIRED ABSENCE OF BOTH CERVIX AND UTERUS: ICD-10-CM

## 2021-07-18 DIAGNOSIS — Z86.018 PERSONAL HISTORY OF OTHER BENIGN NEOPLASM: ICD-10-CM

## 2021-07-18 PROCEDURE — 99283 EMERGENCY DEPT VISIT LOW MDM: CPT

## 2021-07-18 NOTE — ED ADULT NURSE NOTE - TEMPLATE
Date of Service: 01/30/2017    CHIEF COMPLAINT:  Deviated septum.    HISTORY OF PRESENT ILLNESS:  A 52-year-old with a lifelong history of nasal congestion, right side greater than left.  The patient was told years ago she had a deviated septum.  She denies any nasal trauma.  Denies any environmental allergies, has tried Breathe Right strips with some benefit.  She states if she could pull on her right cheek laterally, this helps her breathe through the right side of her nose.  It bothers her throughout the day and especially at night when she is trying to sleep.  The patient denies having recurrent sinus infections.  Denies any environmental allergies.    I have reviewed my nurse's/assistant's note and the patient's EMR for past medical history, surgical history, family history, social history, medications, allergies, and review of systems.       PHYSICAL EXAMINATION:   CONSTITUTIONAL:  Vital signs reviewed in EMR.  Well developed.  PSYCH:  Appropriate mood and affect.  Alert and oriented to person and surroundings.  SKIN:  No concerning facial skin or subcutaneous lesions seen or palpated.  EYES:  Conjunctivae not injected.  Lids without edema.  Pupils equal and round.  EARS:  No EAC lesions.  TMs clear.  NOSE:  No pus or polyps.  There is an obvious caudal nasal septal deviation; this narrows her nostril on the right side with a clear asymmetry and deformity.  The right inferior turbinate is hypertrophied.  [place] maneuver does benefit the patient significantly.  There is mild collapse of the internal nasal valves.   MOUTH AND THROAT:  No exudate or concerning lesions.  NECK:  Trachea midline.  No palpable thyroid masses.  LYMPH:  No facial lymphadenopathy.  No cervical lymphadenopathy.  CARDIOVASCULAR:  No upper extremity edema.  Heart regular rate and rhythm.   RESPIRATORY:  No accessory muscle use.  Lungs clear to auscultation bilaterally.    DATA:  Most recent labs reviewed by me.    ASSESSMENT:   1.   Deviated septum.  2.  Chronic nasal congestion, right greater than left.  3.  Inferior turbinate hypertrophy.    PLAN:  Due to the obvious nasal septal deformity, the patient's response to Breathe Right strips and Janie maneuver, I feel that the patient would benefit significantly from a septoplasty and turbinate reduction.  We did discuss the risks, complications, indications and the perioperative course.  The patient would like to proceed with the surgery.      Dictated By: Fernando Hanna MD  Signing Provider: Fernando Hanna MD    CG/PC ([id number])  DD: 01/30/2017 17:19:29 TD: 01/31/2017 10:14:18    Copy Sent To:     cc: Shirlene Artis MD     Eye

## 2021-07-18 NOTE — ED ADULT NURSE NOTE - PSH
Active Problems   1. Anal polyp (K62.0)   2. Benign essential hypertension (I10)   3. Chest pain (R07.9)   4. Chronic bilateral low back pain with right-sided sciatica (M54.41,G89.29)   5. Cough (R05)   6. Incidental lung nodule (R91.1)   7. Iron deficiency anemia (D50.9)   8. Melanoma of foot (C43.70)   9. Menorrhagia (N92.0)   10. Migraine, unspecified, not intractable, without status migrainosus (G43.909)   11. Obstructive sleep apnea (G47.33)   12. Old MI (myocardial infarction) (I25.2)   13. History of Palpitations (R00.2)   14. Pelvic pain (R10.2)   15. History of Prediabetes (R73.03)   16. Rectal bleed (K62.5)   17. Vitamin D deficiency (E55.9)    Current Meds   1. Aspir-Low 81 MG Oral Tablet Delayed Release; TAKE 1 TABLET BY MOUTH EVERY DAY; Therapy: 54NXV5484 to Recorded   2. Atorvastatin Calcium 20 MG Oral Tablet; TAKE 1 TABLET DAILY; Therapy: 01VVV7319 to (IPWKRXOU:[id number])  Requested for: 22NUZ3361; Last   Rx:29Jun2017 Ordered   3. Butalbital-APAP-Caffeine -40 MG Oral Tablet; TAKE 1 TABLET BY MOUTH THREE   TIMES DAILY AS NEEDED; Therapy: 21Mqx3034 to (Efrain Castro)  Requested for: 68DJX5943; Last   Rx:06Oct2017 Ordered   4. Clopidogrel Bisulfate 75 MG Oral Tablet; TAKE 1 TABLET DAILY; Therapy: 05JTE1647 to (Evaluate:24Jun2018)  Requested for: 88OEF8119; Last   Rx:29Jun2017 Ordered   5. Cyclobenzaprine HCl - 5 MG Oral Tablet; TAKE 1 TABLET 3 TIMES DAILY AS NEEDED; Therapy: 52CJA9886 to [phone])  Requested for: 56XCW7365; Last   Rx:06Oct2017 Ordered   6. DilTIAZem HCl ER Coated Beads 180 MG Oral Capsule Extended Release 24 Hour;   take one tablet by mouth every day; Therapy: 45LGT4004 to [phone])  Requested for: 04Oct2017; Last   Rx:04Oct2017 Ordered   7. Metoprolol Succinate  MG Oral Tablet Extended Release 24 Hour; TAKE 1 TABLET   BY MOUTH EVERY DAY;    Therapy: 87CDR8514 to (Mariza Acosta)  Requested for: 72IIX9233; Last   OQ:80RBS0337 Ordered   8. Nortriptyline HCl - 10 MG Oral Capsule; TAKE ONE CAPSULE AT BEDTIME; Therapy: 13EPU5676 to  Requested for: 54HUF5622 Recorded    Allergies   1. No Known Drug Allergies    Discussion/Summary    Provider Comments: pt calling office complaining of chest pain and increased shortness of breath over the last week. She is currently on the train on her way home from work. I advised ER for evaluation and pt is agreeable. She has someone to take her and will go to [facility] ER. Will notify Dr Arlin Wolfe.       Signatures   Electronically signed by : Noe Newton R.N.; Feb 27 2018  5:32PM CST H/O reduction mammoplasty    H/O total hysterectomy    History of colonoscopy

## 2021-07-18 NOTE — ED PROVIDER NOTE - CLINICAL SUMMARY MEDICAL DECISION MAKING FREE TEXT BOX
eye discomfort with mild redness to left upper lid. no evidence of cellulitis or zoster. no abrasion or ulceration on exam. visual acuity intact. ? dermatitis to eyelid. will tx with artificial tears, warm compresses and f/u with eye clinic. return precautions d/w pt.

## 2021-07-18 NOTE — ED ADULT NURSE NOTE - OBJECTIVE STATEMENT
Patient alert and oriented x 3 came c/o left eye lid pain started this am , rashes noted . Pt . also reported having BL eye teary this am . Pt. reported using lotion for her hair and not sure if it went into her eye . Seen and examined by ROB Santos , safety maintained .

## 2021-07-18 NOTE — ED PROVIDER NOTE - PATIENT PORTAL LINK FT
You can access the FollowMyHealth Patient Portal offered by [facility] by registering at the following website: [email or web address]. By joining [employer]’s FollowMyHealth portal, you will also be able to view your health information using other applications (apps) compatible with our system.

## 2021-07-18 NOTE — ED PROVIDER NOTE - EYES, MLM
Clear bilaterally, pupils equal, round and reactive to light. no uptake of dye. no fb. no ulcerations or abrasions present. visual acuity 20/40 right and 20/20 left. mild redness to left upper lid. no edema. no vesicles. no swelling to lid margin.

## 2021-07-18 NOTE — ED ADULT NURSE NOTE - CHPI ED NUR SYMPTOMS NEG
no blurred vision/no double vision/no drainage/no foreign body/no itching/no photophobia/no purulent drainage

## 2021-07-18 NOTE — ED PROVIDER NOTE - NSFOLLOWUPINSTRUCTIONS_ED_ALL_ED_FT
Follow up with the eye clinic in 1-3 days.       Eye Pain    WHAT YOU NEED TO KNOW:    Eye pain may be caused by a problem within your eye. A problem or condition in another body area can also cause pain that travels to your eye. Some causes of eye pain include dry eyes, inflammation, a sinus infection, or a cluster headache.    DISCHARGE INSTRUCTIONS:    Medicines: You may need any of the following:  •Artificial tears are eyedrops that can help moisturize your eyes and relieve your pain. Ask your healthcare provider how often to use artificial tears.      •NSAIDs, such as ibuprofen, help decrease swelling, pain, and fever. This medicine is available with or without a doctor's order. NSAIDs can cause stomach bleeding or kidney problems in certain people. If you take blood thinner medicine, always ask if NSAIDs are safe for you. Always read the medicine label and follow directions. Do not give these medicines to children under 6 months of age without direction from your child's healthcare provider.      •Take your medicine as directed. Contact your healthcare provider if you think your medicine is not helping or if you have side effects. Tell him of her if you are allergic to any medicine. Keep a list of the medicines, vitamins, and herbs you take. Include the amounts, and when and why you take them. Bring the list or the pill bottles to follow-up visits. Carry your medicine list with you in case of an emergency.      Follow up with your healthcare provider as directed: You may be referred to an eye specialist. Write down your questions so you remember to ask them during your visits.    Contact your healthcare provider if:   •You have a fever.      •Your eye pain gets worse when you move your eyes.      •You have questions or concerns about your condition or care.      Return to the emergency department if:   •You have any vision loss.      •You have sudden vision changes such as blurred vision, double vision, or seeing halos around lights.      •You develop severe eye pain.         © Copyright [employer] 2021           back to top                          © Copyright [employer] 2021

## 2021-07-18 NOTE — ED ADULT NURSE NOTE - NSIMPLEMENTINTERV_GEN_ALL_ED
Implemented All Universal Safety Interventions:  [place] to call system. Call bell, personal items and telephone within reach. Instruct patient to call for assistance. Room bathroom lighting operational. Non-slip footwear when patient is off stretcher. Physically safe environment: no spills, clutter or unnecessary equipment. Stretcher in lowest position, wheels locked, appropriate side rails in place.

## 2021-07-18 NOTE — ED PROVIDER NOTE - OBJECTIVE STATEMENT
69 y/o female with hx of breast ca, hld c/o b/l eye pain x few days. pt states she was using a cream 71 y/o female with hx of breast ca, hld c/o b/l eye pain x few days. pt states she was using a cream on scalp prescribed by dermatology and sx's began shortly aftewards. pt denies any cream in eyes. no visual changes. pt notes pain to upper eyelid and worse on left. + tearing. no FB sensation. no ha or dizzines.s no d/c. no fever or chills. no further complaints.

## 2021-08-18 ENCOUNTER — APPOINTMENT (OUTPATIENT)
Dept: OTOLARYNGOLOGY | Facility: CLINIC | Age: 71
End: 2021-08-18
Payer: MEDICARE

## 2021-08-18 VITALS
HEIGHT: 64 IN | TEMPERATURE: 97.8 F | BODY MASS INDEX: 25.78 KG/M2 | DIASTOLIC BLOOD PRESSURE: 76 MMHG | SYSTOLIC BLOOD PRESSURE: 126 MMHG | HEART RATE: 67 BPM | WEIGHT: 151 LBS

## 2021-08-18 PROCEDURE — 99214 OFFICE O/P EST MOD 30 MIN: CPT | Mod: 25

## 2021-08-18 PROCEDURE — 31231 NASAL ENDOSCOPY DX: CPT

## 2021-08-31 RX ORDER — PHENOL 1.4 %
AEROSOL, SPRAY (ML) MUCOUS MEMBRANE
Refills: 0 | Status: DISCONTINUED | COMMUNITY
End: 2021-08-18

## 2021-08-31 NOTE — CONSULT LETTER
[Dear  ___] : Dear  [unfilled], [Courtesy Letter:] : I had the pleasure of seeing your patient, [unfilled], in my office today. [Please see my note below.] : Please see my note below. [Consult Closing:] : Thank you very much for allowing me to participate in the care of this patient.  If you have any questions, please do not hesitate to contact me. [Sincerely,] : Sincerely, [FreeTextEntry2] : Hai Palomares M.D.\par [address]\[place] [address]  [FreeTextEntry1] : \par \par \par \par  [FreeTextEntry3] : \par Hetal Teresa MD \par Otolaryngology, Head and Neck Surgery \par Residency site \par

## 2021-08-31 NOTE — PHYSICAL EXAM
[Nasal Endoscopy Performed] : nasal endoscopy was performed, see procedure section for findings [Normal] : no neck adenopathy [FreeTextEntry1] : Mildly husky voice, stable. [de-identified] : s [de-identified] : Thyroid gland normal size, no palpable nodules. Both submandibular glands are small and hard; nontender. [de-identified] : 1+ bilateral [de-identified] : Dry mucosa [de-identified] : Slightly more exposed sclera bilateral.

## 2021-08-31 NOTE — PROCEDURE
[FreeTextEntry6] : \par Indication:  chronic sinusitis\par -Verbal consent was obtained from patient prior to exam. \par - Ron-Synephrine and lidocaine 2% spray applied to nose bilaterally.\par Nasal endoscopy was performed with rigid  scope.\par Findings: \par -- Inferior turbinates normal  bilateral.  Inferior meatus clear bilateral.\par -- Septum was midline  \par -- Thick mucus in sinuses, yellowish.  Culture of left maxillary sinus d/c was done.\par -- Bilateral ethmoid cavities and maxillary antrostomies are partially blocked by polyps\par -- Cant see frontal sinus\par -- polypoid mucosa throughout nose/sinuses\par -- Mucus clear bilateral\par -- Nasopharynx without mass or exudate\par -- Adenoids were absent      \par -- Eustachian orifices were clear bilateral\par \par The patient tolerated the procedure well.\par

## 2021-08-31 NOTE — ASSESSMENT
[FreeTextEntry1] : Ms. PERKINS has persistent cough and phlegm, likely due to drainage from sinuses, which also makes asthma worse.\par Her nasal polyps are more prominent today and causing partial blockage of the ethmoid and maxillary sinuses.\par Nasal breathing is a little worse due to the inflammation.\par Culture taken from left maxillary sinus d/c.\par Acute exacerbation of sinusitis.\par Reflux likely controlled.\par \par \par Return in 3 weeks\par

## 2021-09-01 LAB — BACTERIA FLD CULT: ABNORMAL

## 2021-09-09 ENCOUNTER — APPOINTMENT (OUTPATIENT)
Dept: OTOLARYNGOLOGY | Facility: CLINIC | Age: 71
End: 2021-09-09

## 2021-10-01 ENCOUNTER — APPOINTMENT (OUTPATIENT)
Dept: OTOLARYNGOLOGY | Facility: CLINIC | Age: 71
End: 2021-10-01
Payer: MEDICARE

## 2021-10-01 VITALS
DIASTOLIC BLOOD PRESSURE: 64 MMHG | HEART RATE: 89 BPM | BODY MASS INDEX: 25.61 KG/M2 | TEMPERATURE: 97.8 F | WEIGHT: 150 LBS | SYSTOLIC BLOOD PRESSURE: 99 MMHG | HEIGHT: 64 IN

## 2021-10-01 PROCEDURE — 99214 OFFICE O/P EST MOD 30 MIN: CPT | Mod: 25

## 2021-10-01 PROCEDURE — 31231 NASAL ENDOSCOPY DX: CPT

## 2021-10-31 RX ORDER — PREDNISONE 10 MG/1
10 TABLET ORAL
Qty: 18 | Refills: 0 | Status: COMPLETED | COMMUNITY
Start: 2021-08-18 | End: 2021-08-26

## 2021-10-31 RX ORDER — AMOXICILLIN AND CLAVULANATE POTASSIUM 500; 125 MG/1; MG/1
500-125 TABLET, FILM COATED ORAL
Qty: 20 | Refills: 0 | Status: COMPLETED | COMMUNITY
Start: 2021-08-18 | End: 2021-08-29

## 2021-10-31 NOTE — ASSESSMENT
[FreeTextEntry1] : Ms. PERKINS has resolved cough and less phlegm after treatment with antibiotics and oral steroids last month. Polypoid mucosa throughout nose/sinuses.\par BP is on low side today -- 99/64\par \par -- start budesonide nasal rinses (she can used tap water that she has boiled)\par -- she didn’t get xhance inhaler, we will check with pharmacy\par -- patient to speak with PCP about low BP\par \par Return in January 2022

## 2021-10-31 NOTE — PHYSICAL EXAM
[Nasal Endoscopy Performed] : nasal endoscopy was performed, see procedure section for findings [Normal] : no neck adenopathy [FreeTextEntry1] : Mildly husky voice, stable. [de-identified] : 1+ bilateral [de-identified] : Thyroid gland normal size, no palpable nodules. Both submandibular glands are small and hard; nontender. [de-identified] : Dry mucosa [de-identified] : Slightly more exposed sclera bilateral.

## 2021-10-31 NOTE — HISTORY OF PRESENT ILLNESS
[de-identified] : Ms. JOSEPH reports that cough is gone, phlegm improved with antibiotics. \par Nasal breathing is okay.  Didnt use the budesonide nasal rinse since she couldn’t find sterile water.\par Salivary gland swelling is improving with triple antibiotic ointment that she's using.\par Asthma is controlled on medication.\par She is on pantoprazole for reflux and no symptoms.\par She is still having ear itching but not too much.\par Her eyes have been dry after blepharoplasty in May 2021.\par S/p endoscopic removal of recurrent left ethmoid polyp in office on December 19, 2017.\par S/P revision bilateral endoscopic sinus surgery (frontal, maxillary, total ethmoid, sphenoid) and partial middle turbinate resection bilateral on September 11, 2017. \par \par \par VISIT (06/25/2021):\par Ms. Joseph still has phlegm and cough, due to asthma. Able to breathe through nose. No postnasal drip, rhinorrhea.\par Treated in Jan 2021 with antibiotics and oral steroids for 6 months of persistent productive cough due to sinusitis exacerbation --> relief. Treated in 3/2021 with cephalexin and Medrol Dosepak for return cough/phlegm.\par On mometasone spray and Singulair.\par Went to  on in April x 2 months - couldn’t stay by the pool because had trouble breathing, thought to be from cleaning chemicals. She had to take albuterol 4 times in a row.\par S/p face/neck/eyebrow lift in  1 month ago.\par S/p endoscopic removal of recurrent left ethmoid polyp in office on December 19, 2017.\par S/P revision bilateral endoscopic sinus surgery (frontal, maxillary, total ethmoid, sphenoid) and partial middle turbinate resection bilateral on September 11, 2017. \par On pantoprazole for reflux and has no heartburn\par Hx of vocal cord polyps and chronic hoarseness.\par \par \par CULTURE Left maxillary sinus (8/18/2021)\par Numerous Pseudomonas aeruginosa\par Moderate Proteus mirabilis\par Few Staphylococcus epidermidis

## 2021-10-31 NOTE — PROCEDURE
[FreeTextEntry6] : \par Indication:  chronic sinusitis\par -Verbal consent was obtained from patient prior to exam. \par - Ron-Synephrine and lidocaine 2% spray applied to nose bilaterally.\par Nasal endoscopy was performed with flexible  scope.\par Findings: \par -- Inferior turbinates normal bilateral.  Inferior meatus clear bilateral.\par -- Septum was midline  \par -- Small polyp near left maxillary antrostomy\par -- Mildly polypoid mucosa throughout nose/sinuses.  \par -- Bilateral ethmoid cavities and maxillary antrostomies are patent.\par -- Cant see frontal sinus openings\par -- Mucus clear bilateral\par -- Partial resection bilateral middle turbinates\par \par The patient tolerated the procedure well.\par

## 2021-10-31 NOTE — CONSULT LETTER
[Dear  ___] : Dear  [unfilled], [Courtesy Letter:] : I had the pleasure of seeing your patient, [unfilled], in my office today. [Please see my note below.] : Please see my note below. [Consult Closing:] : Thank you very much for allowing me to participate in the care of this patient.  If you have any questions, please do not hesitate to contact me. [Sincerely,] : Sincerely, [FreeTextEntry2] : Hai Palomares M.D.\par [address]\[place] [address]  [FreeTextEntry1] : \par \par \par \par  [FreeTextEntry3] : \par Hetal Teresa MD \par Otolaryngology, Head and Neck Surgery \par Residency site \par

## 2021-11-01 ENCOUNTER — RX RENEWAL (OUTPATIENT)
Age: 71
End: 2021-11-01

## 2022-01-05 ENCOUNTER — APPOINTMENT (OUTPATIENT)
Dept: OTOLARYNGOLOGY | Facility: CLINIC | Age: 72
End: 2022-01-05
Payer: MEDICARE

## 2022-01-05 ENCOUNTER — APPOINTMENT (OUTPATIENT)
Dept: OTOLARYNGOLOGY | Facility: CLINIC | Age: 72
End: 2022-01-05

## 2022-01-05 VITALS
HEIGHT: 64 IN | TEMPERATURE: 96.9 F | HEART RATE: 75 BPM | DIASTOLIC BLOOD PRESSURE: 76 MMHG | SYSTOLIC BLOOD PRESSURE: 144 MMHG | BODY MASS INDEX: 25.44 KG/M2 | WEIGHT: 149 LBS

## 2022-01-05 DIAGNOSIS — Z87.09 PERSONAL HISTORY OF OTHER DISEASES OF THE RESPIRATORY SYSTEM: ICD-10-CM

## 2022-01-05 DIAGNOSIS — R09.89 OTHER SPECIFIED SYMPTOMS AND SIGNS INVOLVING THE CIRCULATORY AND RESPIRATORY SYSTEMS: ICD-10-CM

## 2022-01-05 PROCEDURE — 99072 ADDL SUPL MATRL&STAF TM PHE: CPT

## 2022-01-05 PROCEDURE — 99214 OFFICE O/P EST MOD 30 MIN: CPT | Mod: 25

## 2022-01-05 PROCEDURE — 31231 NASAL ENDOSCOPY DX: CPT

## 2022-01-05 NOTE — HISTORY OF PRESENT ILLNESS
[de-identified] : Ms. JOSEPH reports persistent cough since November 2021. She still has clear phlegm from her throat.\par She is coughing all the time and is embarrassed that people think she is sick.  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 She received Xhance and is using it once at night.\par Asthma is controlled, no trouble breathing.\par Her eyes have been dry after blepharoplasty in May 2021.\par She is on pantoprazole for reflux and no symptoms.\par She is still having ear itching bilateral, no pain or d/c.\par S/p endoscopic removal of recurrent left ethmoid polyp in office on December 19, 2017.\par S/P revision bilateral endoscopic sinus surgery (frontal, maxillary, total ethmoid, sphenoid) and partial middle turbinate resection bilateral on September 11, 2017. \par \par VISIT (10/01/2021)\par Ms. JOSEPH reports that cough is gone, phlegm improved with antibiotics. \par Nasal breathing is okay. Didnt use the budesonide nasal rinse since she couldn’t find sterile water.\par Salivary gland swelling is improving with triple antibiotic ointment that she's using.\par Asthma is controlled on medication.\par She is on pantoprazole for reflux and no symptoms.\par She is still having ear itching but not too much.\par Her eyes have been dry after blepharoplasty in May 2021.\par S/p endoscopic removal of recurrent left ethmoid polyp in office on December 19, 2017.\par S/P revision bilateral endoscopic sinus surgery (frontal, maxillary, total ethmoid, sphenoid) and partial middle turbinate resection bilateral on September 11, 2017. \par \par \par VISIT (06/25/2021):\par Ms. Joseph still has phlegm and cough, due to asthma. Able to breathe through nose. No postnasal drip, rhinorrhea.\par Treated in Jan 2021 with antibiotics and oral steroids for 6 months of persistent productive cough due to sinusitis exacerbation --> relief. Treated in 3/2021 with cephalexin and Medrol Dosepak for return cough/phlegm.\par On mometasone spray and Singulair.\par Went to  [place] in [place] x 2 months - couldn’t stay by the pool because had trouble breathing, thought to be from cleaning chemicals. She had to take albuterol 4 times in a row.\par S/p face/neck/eyebrow lift in  1 month ago.\par S/p endoscopic removal of recurrent left ethmoid polyp in office on December 19, 2017.\par S/P revision bilateral endoscopic sinus surgery (frontal, maxillary, total ethmoid, sphenoid) and partial middle turbinate resection bilateral on September 11, 2017. \par On pantoprazole for reflux and has no heartburn\par Hx of vocal cord polyps and chronic hoarseness.\par \par \par CULTURE Left maxillary sinus (8/18/2021)\par Numerous Pseudomonas aeruginosa\par Moderate Proteus mirabilis\par Few Staphylococcus epidermidis \par

## 2022-01-05 NOTE — ASSESSMENT
[FreeTextEntry1] : Ms. Joseph has chronic productive cough since November 2021 and is unable to sleep through the night.\par No trouble breathing/asthma.\par She has acute exacerbation of chronic sinusitis and regrowth of polyps.\par New culture from left maxillary sinus taken today.  In August, grew out Psuedomonas pan-sensitive.\par Dry ears from dry skin.\par \par --> increase Xhance to BID\par --> continue budesonide rinses\par --> start Robafen-DM liquid\par --> antibiotics and oral steroid taper\par --> call pt with culture results; will change antibiotics if needed.\par --> mineral oil drops to skin ears PRN itch\par \par Return in 2 months or earlier if sx don't resolve

## 2022-01-05 NOTE — CONSULT LETTER
[Dear  ___] : Dear  [unfilled], [Courtesy Letter:] : I had the pleasure of seeing your patient, [unfilled], in my office today. [Please see my note below.] : Please see my note below. [Consult Closing:] : Thank you very much for allowing me to participate in the care of this patient.  If you have any questions, please do not hesitate to contact me. [Sincerely,] : Sincerely, [FreeTextEntry2] : Hai Palomares M.D.\par [address]\[place] [address]  [FreeTextEntry1] : \par \par \par \par  [FreeTextEntry3] : \par Hetal Teresa MD \par Otolaryngology, Head and Neck Surgery \par Residency site \par

## 2022-01-05 NOTE — PROCEDURE
[FreeTextEntry6] : \par Indication:  chronic sinusitis\par -Verbal consent was obtained from patient prior to exam. \par - Ron-Synephrine and lidocaine 2% spray applied to nose bilaterally.\par Nasal endoscopy was performed with flexible  scope.\par Findings: \par -- Inferior turbinates normal bilateral.  Inferior meatus clear bilateral.\par -- Septum was midline  \par -- RIGHT middle meatus polyp blocking most of antrostomy and polyps in ethmoid cavity.  Mucosal edema. Thick yellow mucus draining from ethmoid cavity.  Cant see frontal sinus opening.\par -- LEFT small polyp in anterior ethmoid cavity.  Mucosal edema.  Thick yellow mucus draining from maxillary antrostomy and ethmoid   Cant see frontal sinus opening\par -- Mildly polypoid mucosa throughout nose/sinuses.  \par -- Partial resection bilateral middle turbinates\par \par The patient tolerated the procedure well.\par

## 2022-01-05 NOTE — PHYSICAL EXAM
[Nasal Endoscopy Performed] : nasal endoscopy was performed, see procedure section for findings [Normal] : no neck adenopathy [FreeTextEntry1] : Mildly husky voice, stable. [de-identified] : Thyroid gland normal size, no palpable nodules. Both submandibular glands are small and hard; nontender. [de-identified] : dry skin in both EACs, no wax. [de-identified] : Dry mucosa [de-identified] : 1+ bilateral

## 2022-01-06 ENCOUNTER — NON-APPOINTMENT (OUTPATIENT)
Age: 72
End: 2022-01-06

## 2022-01-12 ENCOUNTER — NON-APPOINTMENT (OUTPATIENT)
Age: 72
End: 2022-01-12

## 2022-01-12 LAB — EAR NOSE AND THROAT CULTURE: ABNORMAL

## 2022-01-13 ENCOUNTER — NON-APPOINTMENT (OUTPATIENT)
Age: 72
End: 2022-01-13

## 2022-03-10 ENCOUNTER — RX RENEWAL (OUTPATIENT)
Age: 72
End: 2022-03-10

## 2022-03-25 ENCOUNTER — NON-APPOINTMENT (OUTPATIENT)
Age: 72
End: 2022-03-25

## 2022-03-25 ENCOUNTER — APPOINTMENT (OUTPATIENT)
Dept: OTOLARYNGOLOGY | Facility: CLINIC | Age: 72
End: 2022-03-25
Payer: MEDICARE

## 2022-03-25 VITALS
WEIGHT: 149 LBS | HEIGHT: 64 IN | SYSTOLIC BLOOD PRESSURE: 164 MMHG | HEART RATE: 73 BPM | TEMPERATURE: 97.3 F | DIASTOLIC BLOOD PRESSURE: 90 MMHG | BODY MASS INDEX: 25.44 KG/M2

## 2022-03-25 PROCEDURE — 99214 OFFICE O/P EST MOD 30 MIN: CPT | Mod: 25

## 2022-03-25 PROCEDURE — 31231 NASAL ENDOSCOPY DX: CPT

## 2022-03-25 RX ORDER — PANTOPRAZOLE 40 MG/1
40 TABLET, DELAYED RELEASE ORAL
Qty: 30 | Refills: 0 | Status: COMPLETED | COMMUNITY
Start: 2016-10-31 | End: 2022-03-25

## 2022-03-25 NOTE — PROCEDURE
[FreeTextEntry6] : \par Indication:b  chronic sinusitis\par -Verbal consent was obtained from patient prior to exam. \par - Ron-Synephrine and lidocaine 2% spray applied to nose bilaterally.\par Nasal endoscopy was performed with flexible scope.\par Findings: \par -- Inferior turbinates normal bilateral.  Inferior meatus clear bilateral.\par -- Septum was midline \par -- LEFT: Polypoid narrowing of the left maxillary antrostomy with visible thick white mucus drainage. Ethmoid cavity is widely open; no polyps. Sphenoidotomy open but narrow. Frontal outflow tract is partially obstructed by polyps.\par -- RIGHT: Polypoid right middle turbinate remnant. Maxillary antrostomy open but with thick white mucus drainage. Ethmoid cavity is open, no polyps. Sphenoidotomy is open\par -- Partial resection bilateral middle turbinates\par \par The patient tolerated the procedure well.\par   [de-identified] : \par Indication: cough\par -Verbal consent was obtained from patient prior to procedure.\par -Ron-Synephrine and lidocaine 2% spray applied to the nasal cavities.\par Flexible laryngoscopy was performed via right nostril and revealed the following:\par   -- Nasopharynx had no mass or exudate. Cobblestoning posterior pharyngeal wall\par   -- Base of tongue was symmetric and not enlarged.\par   -- Vallecula was clear\par   -- Epiglottis, both aryepiglottic folds and both false vocal folds were normal\par   -- Arytenoids both with mild edema and no erythema \par   -- True vocal folds were fully mobile and without lesions. \par   -- Post cricoid area was clear.\par   -- Interarytenoid edema was present.\par   -- No lesions seen in laryngopharynx\par \par The patient tolerated the procedure well.

## 2022-03-25 NOTE — HISTORY OF PRESENT ILLNESS
[de-identified] : Ms. JOSEPH was treated with Augmentin and steroid taper for acute exacerbation of chronic sinusitis and polyp regrowth in Jan 2022.  Culture from left maxillary sinus grew out Citrobacter koseri, sensitive to Augmentin.\par S/p endoscopic removal of recurrent left ethmoid polyp in office on December 19, 2017.\par S/p revision bilateral endoscopic sinus surgery (frontal, maxillary, total ethmoid, sphenoid) and partial middle turbinate resection bilateral on September 11, 2017.\par \par Today, she reports that cough resolved after treatment but then returned about 2 weeks ago. Cough is not as bad as before and is coming from the throat; mucus clear with occasional yellow color.  Has postnasal drip; no runny nose or congestion.  She is able to smell and taste.\par  Asthma is controlled, no trouble breathing.\par She is doing budesonide rinses. She was prescribed Xhance inhaler previously, but she today said she didn’t receive it. \par On mometasone spray and Singulair.\par She has spring tree pollen allergies.\par Dry eyes are much improved..\par She is on pantoprazole for reflux and has no heartburn.\par She is still having ear itching bilateral, no pain or d/c. She could not find mineral oil.\par \par VISIT 01/05/2022\par Ms. JOSEPH reports persistent cough since November 2021. She still has clear phlegm from her throat.\par She is coughing all the time and is embarrassed that people think she is sick.  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 She received Xhance and is using it once at night.\par Asthma is controlled, no trouble breathing.\par Her eyes have been dry after blepharoplasty in May 2021.\par She is on pantoprazole for reflux and no symptoms.\par She is still having ear itching bilateral, no pain or d/c.\par \par VISIT (10/01/2021)\par Ms. JOSEPH reports that cough is gone, phlegm improved with antibiotics. \par Nasal breathing is okay. Didnt use the budesonide nasal rinse since she couldn’t find sterile water.\par Salivary gland swelling is improving with triple antibiotic ointment that she's using.\par Asthma is controlled on medication.\par She is on pantoprazole for reflux and no symptoms.\par She is still having ear itching but not too much.\par Her eyes have been dry after blepharoplasty in May 2021.\par S/p endoscopic removal of recurrent left ethmoid polyp in office on December 19, 2017.\par S/P revision bilateral endoscopic sinus surgery (frontal, maxillary, total ethmoid, sphenoid) and partial middle turbinate resection bilateral on September 11, 2017. \par \par \par VISIT (06/25/2021):\par Ms. Joseph still has phlegm and cough, due to asthma. Able to breathe through nose. No postnasal drip, rhinorrhea.\par Treated in Jan 2021 with antibiotics and oral steroids for 6 months of persistent productive cough due to sinusitis exacerbation --> relief. Treated in 3/2021 with cephalexin and Medrol Dosepak for return cough/phlegm.\par On mometasone spray and Singulair.\par Went to  [place] in [place] x 2 months - couldn’t stay by the pool because had trouble breathing, thought to be from cleaning chemicals. She had to take albuterol 4 times in a row.\par S/p face/neck/eyebrow lift in  1 month ago.\par S/p endoscopic removal of recurrent left ethmoid polyp in office on December 19, 2017.\par S/P revision bilateral endoscopic sinus surgery (frontal, maxillary, total ethmoid, sphenoid) and partial middle turbinate resection bilateral on September 11, 2017. \par On pantoprazole for reflux and has no heartburn\par Hx of vocal cord polyps and chronic hoarseness.\par \par \par CULTURE Left maxillary sinus (01/05/2022)\par Numerous Citrobacter Koseri (resistant only to ampicillin alone)\par Rare Proteus mirabilis  (susceptible to all tested meds)\par Normal respiratory santo \par \par \par CULTURE Left maxillary sinus (8/18/2021)\par Numerous Pseudomonas aeruginosa\par Moderate Proteus mirabilis\par Few Staphylococcus epidermidis \par

## 2022-03-25 NOTE — END OF VISIT
[FreeTextEntry3] : All medical record entries made by the Scribe were at my, Dr. Hetal Teresa, direction and personally dictated by me on 03/25/2022. I have reviewed the chart and agree that the record accurately reflects my personal performance of the history, physical exam, assessment and plan. I have also personally directed, reviewed, and agreed with the chart. [Time Spent: ___ minutes] : I have spent [unfilled] minutes of time on the encounter.

## 2022-03-25 NOTE — ASSESSMENT
[FreeTextEntry1] : Ms. Joseph had resolution of chronic productive cough after antibiotics and oral steroids in January 2021 for exacerbation of chronic sinusitis.  However, cough has returned over the past two weeks, described as "not as bad".  Cough is due to postnasal drip.  \par On exam today, the polyps have receded, but there is still narrowing of left maxillary antrostomy by polyps. There is white mucus actively draining from both maxillary sinuses. Ethmoid cavities are clear.\par She apparently did not get the Xhance inhaler (pharmacy said she declined when called).\par Asthma is under better control.  Allergies controlled with Singulair.\par Ear itching from dry skin was treated with topical triamcinolone ointment today.\par \par --> We reviewed the advantages and application of Xhance inhaler (video of use was shown to her). She will stop her other nasal steroid spray when this medication is started.\par --> continue budesonide rinses\par --> mineral oil (same thing in baby oil, which she has at home) to the ears prn for itch\par \par Patient will be traveling to  in late April 2022, so she will return to see me prior to this travel.\par

## 2022-03-25 NOTE — PHYSICAL EXAM
[Nasal Endoscopy Performed] : nasal endoscopy was performed, see procedure section for findings [FreeTextEntry1] : Slightly husky voice, loud. [Laryngoscopy Performed] : laryngoscopy was performed, see procedure section for findings [de-identified] : 1+ bilateral [Normal] : no neck adenopathy

## 2022-03-25 NOTE — CONSULT LETTER
[FreeTextEntry2] : Hai Palomares M.D.\par [address]\[place] [address]  [FreeTextEntry1] : \par \par \par \par  [FreeTextEntry3] : \par Hetal Teresa MD \par Otolaryngology, Head and Neck Surgery \par Residency site \par

## 2022-03-30 ENCOUNTER — APPOINTMENT (OUTPATIENT)
Dept: BREAST CENTER | Facility: CLINIC | Age: 72
End: 2022-03-30
Payer: MEDICARE

## 2022-03-30 VITALS
SYSTOLIC BLOOD PRESSURE: 152 MMHG | HEIGHT: 63 IN | DIASTOLIC BLOOD PRESSURE: 77 MMHG | BODY MASS INDEX: 25.87 KG/M2 | OXYGEN SATURATION: 98 % | WEIGHT: 146 LBS | HEART RATE: 88 BPM

## 2022-03-30 DIAGNOSIS — Z78.9 OTHER SPECIFIED HEALTH STATUS: ICD-10-CM

## 2022-03-30 PROCEDURE — 99213 OFFICE O/P EST LOW 20 MIN: CPT

## 2022-03-30 RX ORDER — ATENOLOL AND CHLORTHALIDONE 50; 25 MG/1; MG/1
TABLET ORAL
Refills: 0 | Status: COMPLETED | COMMUNITY
End: 2022-03-25

## 2022-03-30 RX ORDER — DEXTROMETHORPHAN HBR, GUAIFENESIN 20; 200 MG/10ML; MG/10ML
10-100 SOLUTION ORAL EVERY 6 HOURS
Qty: 1 | Refills: 0 | Status: COMPLETED | COMMUNITY
Start: 2022-01-05 | End: 2022-03-25

## 2022-03-30 RX ORDER — BUDESONIDE 0.5 MG/2ML
0.5 INHALANT ORAL
Qty: 2 | Refills: 5 | Status: COMPLETED | COMMUNITY
Start: 2021-08-18 | End: 2022-03-30

## 2022-03-30 RX ORDER — PREDNISONE 10 MG/1
10 TABLET ORAL
Qty: 18 | Refills: 0 | Status: COMPLETED | COMMUNITY
Start: 2022-01-05 | End: 2022-03-25

## 2022-03-30 RX ORDER — AMOXICILLIN AND CLAVULANATE POTASSIUM 875; 125 MG/1; MG/1
875-125 TABLET, COATED ORAL
Qty: 20 | Refills: 0 | Status: COMPLETED | COMMUNITY
Start: 2022-01-05 | End: 2022-03-25

## 2022-04-13 ENCOUNTER — APPOINTMENT (OUTPATIENT)
Dept: OTOLARYNGOLOGY | Facility: CLINIC | Age: 72
End: 2022-04-13
Payer: MEDICARE

## 2022-04-13 VITALS
DIASTOLIC BLOOD PRESSURE: 74 MMHG | WEIGHT: 149 LBS | HEART RATE: 69 BPM | TEMPERATURE: 97.8 F | HEIGHT: 64 IN | BODY MASS INDEX: 25.44 KG/M2 | SYSTOLIC BLOOD PRESSURE: 138 MMHG

## 2022-04-13 DIAGNOSIS — J06.9 ACUTE UPPER RESPIRATORY INFECTION, UNSPECIFIED: ICD-10-CM

## 2022-04-13 PROCEDURE — 31231 NASAL ENDOSCOPY DX: CPT

## 2022-04-13 PROCEDURE — 99213 OFFICE O/P EST LOW 20 MIN: CPT | Mod: 25

## 2022-05-24 PROBLEM — J06.9 RECENT URI: Status: RESOLVED | Noted: 2022-05-24 | Resolved: 2022-06-23

## 2022-05-24 NOTE — PHYSICAL EXAM
[Nasal Endoscopy Performed] : nasal endoscopy was performed, see procedure section for findings [Midline] : trachea located in midline position [FreeTextEntry1] : Baseline husky voice, strong volume. [de-identified] : Dry, flaky skin in EACs; no cerumen impaction. [de-identified] : Some redness on posterior pharyngeal wall. [Normal] : no masses and lesions seen, face is symmetric

## 2022-05-24 NOTE — PROCEDURE
[FreeTextEntry6] : \par Indication: chronic sinusitis\par -Verbal consent was obtained from patient prior to exam. \par - Ron-Synephrine and lidocaine 2% spray applied to nose bilaterally.\par Nasal endoscopy was performed with 0 degree rigid scope.\par Findings: \par -- Septum was intact\par -- Bilateral inferior turbinates mildly edematous\par -RIGHT:\par       -- Middle turbinate partially resected; mild edema\par       -- Maxillary antrostomy with clear drainage and polypoid lining superiorly\par       -- Mildly polypoid lining in ethmoid cavity\par       -- Sphenoidotomy is open but narrow\par       -- Frontal sinus outflow open but mildly edematous lining\par       -- No babatunde polyps\par - LEFT: \par       -- Middle turbinate partially resection; slight polypoid change\par       -- Maxillary antrostomy patent with no obstruction or drainage\par       -- Ethmoid cavity patent, with mild edema\par       -- Sphenoidotomy is open\par       -- Frontal sinus outflow open but mildly edematous lining\par       -- No babatunde polyps\par - NP clear.\par \par The patient tolerated the procedure well.\par \par

## 2022-05-24 NOTE — HISTORY OF PRESENT ILLNESS
[de-identified] : Ms. Joseph had resolution of chronic productive cough after antibiotics and oral steroids in January 2022 for exacerbation of chronic sinusitis.\par S/p endoscopic removal of recurrent left ethmoid polyp in office on December 19, 2017.\par S/p revision bilateral endoscopic sinus surgery (frontal, maxillary, total ethmoid, sphenoid) and partial middle turbinate resection bilateral on September 11, 2017.\par \par Today she feels like she "has a cold" in her throat. She is spitting out clear mucus. She has runny nose.\par Asthma is controlled, no trouble breathing.\par She is doing budesonide rinses for sinuses  On mometasone spray and Singulair.  Xhance is not yet approved by her insurance company.\par She has spring tree pollen allergies.\par Dry eyes are much improved.\par She is on pantoprazole for reflux and has no heartburn.\par She is still having ear itching bilateral, no pain or d/c. She did not buy mineral oil.\par \par VISIT (03/25/2022)\par Today, she reports that cough resolved after treatment but then returned about 2 weeks ago. Cough is not as bad as before and is coming from the throat; mucus clear with occasional yellow color. Has postnasal drip; no runny nose or congestion. She is able to smell and taste.\par  Asthma is controlled, no trouble breathing.\par She is doing budesonide rinses. She was prescribed Xhance inhaler previously, but she today said she didn’t receive it. \par On mometasone spray and Singulair.\par She has spring tree pollen allergies.\par Dry eyes are much improved..\par She is on pantoprazole for reflux and has no heartburn.\par She is still having ear itching bilateral, no pain or d/c. She could not find mineral oil.\par \par VISIT 01/05/2022\par Ms. JOSEPH reports persistent cough since November 2021. She still has clear phlegm from her throat.\par She is coughing all the time and is embarrassed that people think she is sick. 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 She received Xhance and is using it once at night.\par Asthma is controlled, no trouble breathing.\par Her eyes have been dry after blepharoplasty in May 2021.\par She is on pantoprazole for reflux and no symptoms.\par She is still having ear itching bilateral, no pain or d/c.\par \par VISIT (10/01/2021)\par Ms. JOSEPH reports that cough is gone, phlegm improved with antibiotics. \par Nasal breathing is okay. Didnt use the budesonide nasal rinse since she couldn’t find sterile water.\par Salivary gland swelling is improving with triple antibiotic ointment that she's using.\par Asthma is controlled on medication.\par She is on pantoprazole for reflux and no symptoms.\par She is still having ear itching but not too much.\par Her eyes have been dry after blepharoplasty in May 2021.\par S/p endoscopic removal of recurrent left ethmoid polyp in office on December 19, 2017.\par S/P revision bilateral endoscopic sinus surgery (frontal, maxillary, total ethmoid, sphenoid) and partial middle turbinate resection bilateral on September 11, 2017. \par \par \par VISIT (06/25/2021):\par Ms. Joseph still has phlegm and cough, due to asthma. Able to breathe through nose. No postnasal drip, rhinorrhea.\par Treated in Jan 2021 with antibiotics and oral steroids for 6 months of persistent productive cough due to sinusitis exacerbation --> relief. Treated in 3/2021 with cephalexin and Medrol Dosepak for return cough/phlegm.\par On mometasone spray and Singulair.\par Went to  [place] in [place] x 2 months - couldn’t stay by the pool because had trouble breathing, thought to be from cleaning chemicals. She had to take albuterol 4 times in a row.\par S/p face/neck/eyebrow lift in  1 month ago.\par S/p endoscopic removal of recurrent left ethmoid polyp in office on December 19, 2017.\par S/P revision bilateral endoscopic sinus surgery (frontal, maxillary, total ethmoid, sphenoid) and partial middle turbinate resection bilateral on September 11, 2017. \par On pantoprazole for reflux and has no heartburn\par Hx of vocal cord polyps and chronic hoarseness.\par \par \par CULTURE Left maxillary sinus (01/05/2022)\par Numerous Citrobacter Koseri (resistant only to ampicillin alone)\par Rare Proteus mirabilis (susceptible to all tested meds)\par Normal respiratory santo \par \par \par CULTURE Left maxillary sinus (8/18/2021)\par Numerous Pseudomonas aeruginosa\par Moderate Proteus mirabilis\par Few Staphylococcus epidermidis \par

## 2022-05-24 NOTE — END OF VISIT
[FreeTextEntry3] : All medical record entries made by the Scribe were at my, Dr. Hetal Teresa, direction and personally dictated by me on 04/13/2022. I have reviewed the chart and agree that the record accurately reflects my personal performance of the history, physical exam, assessment and plan. I have also personally directed, reviewed, and agreed with the chart. [Time Spent: ___ minutes] : I have spent [unfilled] minutes of time on the encounter.

## 2022-05-24 NOTE — CONSULT LETTER
[Dear  ___] : Dear  [unfilled], [Courtesy Letter:] : I had the pleasure of seeing your patient, [unfilled], in my office today. [Please see my note below.] : Please see my note below. [Consult Closing:] : Thank you very much for allowing me to participate in the care of this patient.  If you have any questions, please do not hesitate to contact me. [Sincerely,] : Sincerely, [FreeTextEntry2] : Hai Palomares M.D.\par [address]\[place] [address]  [FreeTextEntry1] : \par \par \par \par  [FreeTextEntry3] : \par Hetal Teresa MD \par Otolaryngology, Head and Neck Surgery \par Residency site \par

## 2022-05-24 NOTE — ASSESSMENT
[FreeTextEntry1] : Ms. Joseph had resolution of cough after treatment of acute exacerbation of sinusitis.\par Now she is getting over a URI and has mild allergic rhinitis.\par Overall  improvement of chronic sinusitis, and no babatunde polyps seen on exam today.\par \par PLAN\par --> Continue nasal irrigations\par --> Await receipt of Xhance inhaler\par \par Return in 3 months

## 2022-10-06 ENCOUNTER — APPOINTMENT (OUTPATIENT)
Dept: OTOLARYNGOLOGY | Facility: CLINIC | Age: 72
End: 2022-10-06
Payer: MEDICARE

## 2022-10-06 VITALS — HEIGHT: 64 IN | HEART RATE: 78 BPM | DIASTOLIC BLOOD PRESSURE: 83 MMHG | SYSTOLIC BLOOD PRESSURE: 149 MMHG

## 2022-10-06 PROCEDURE — XXXXX: CPT | Mod: 1L

## 2022-11-22 ENCOUNTER — RX RENEWAL (OUTPATIENT)
Age: 72
End: 2022-11-22

## 2022-12-14 ENCOUNTER — APPOINTMENT (OUTPATIENT)
Dept: BREAST CENTER | Facility: CLINIC | Age: 72
End: 2022-12-14

## 2022-12-14 ENCOUNTER — NON-APPOINTMENT (OUTPATIENT)
Age: 72
End: 2022-12-14

## 2022-12-14 VITALS
HEART RATE: 89 BPM | BODY MASS INDEX: 24.41 KG/M2 | SYSTOLIC BLOOD PRESSURE: 164 MMHG | WEIGHT: 143 LBS | DIASTOLIC BLOOD PRESSURE: 77 MMHG | HEIGHT: 64 IN

## 2022-12-14 PROCEDURE — 99213 OFFICE O/P EST LOW 20 MIN: CPT

## 2022-12-14 NOTE — HISTORY OF PRESENT ILLNESS
[FreeTextEntry1] : Patient is a 71yo F who presents today for breast cancer surveillance. 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 (microcal 5cm on preop MG) Denies family history of breast or ovarian cancer. No genetic testing. Patient denies palpable masses or skin changes bilaterally.\par \par TNM Staging: R pT1mi, pN0. L pTis, pN0\par \par 9/15/17: L MG & US- MG- 0.7cm LIQ stable mass, 0.7cm 9:00 stable mass w/ coarse calcs possibly degenerating FA; US previously seen 0.8cm mass 7:00 8FN w/ new irregularity rec US core bx, stable 0.8cm 7:00 8FN mass (bx vs f/u), 0.6cm 9:00 6FN mass likely stable & c/w fibroadenoma on MG ( bx vs f/u), BIRADS 4\par 9/25/17: L US core bx of 7:00 8FN mass path- atypical papillary lesion, concordant, hourglass marker\par 11/16/17: R MG- stable benign calcs, stable UOQ LN, BIRADS 2\par 12/12/17: L US core bx of second 7:00 8FN mass path- IDP, marker, concordant; 9:00 6FN mass path- IDP, R shaped marker, concordant\par 11/20/18: B/l MG- dense, stable benign calcs, R- UOQ stable benign LN, L- stable UOQ post sx changes, 0.4cm UOQ 6FN round mass rec add imaging, BIRADS 0\par 12/4/18: L MG & B/l US- MG- UOQ persistent asymmetric density; US- L- 0.6cm 1:00 2FN benign cyst c/w nodular density on MG, R- benign cyst, BIRADS 2\par 11/22/19: B/l MG & US- MG- R- 0.6cm LIQ 6FN new linear branching calcs rec R Dx MG, UOQ LN slightly larger than 2016, L- stable UOQ postsx changes; US- R- 0.7cm 7:00 5FN benign hypoechoic mass, 1cm 10:00 10FN intramammary LN c/w LN on MG w/ mild increase in cortical thickening rec targeted R US, L-several cysts, 0.5cm 2:00 6FN dystrophic calc, BIRADS 0\par 2/25/20: R MG & US- MG- LIQ 6FN persistent cluster of linear branching calcs rec stereo bx; US- 0.8cm 10:00 10FN intramammary LN wnl rec 6 mth f/u, BIRADS 4\par 3/7/20: R stereo bx of 3:00 calcs - DCIS w/ extensive necrosis, ER/KS neg, jessica/cork marker, concordant, 5cm\par 5/4/20: B/l TM & B/l SNB- 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 L DCIS (intermediate nuclear grade), ADH, radial scar, negate margins, 0/1LN. right microinvasive IDC 0/1 lymph node, left DCIS 0/1 lymph node. ER- (0%), KS- (0%), HER2+ (#+)

## 2022-12-14 NOTE — PHYSICAL EXAM
[de-identified] : B/l chest wall/axilla/supraclavicular area: No evidence of recurrence\par IMPLANTS!!

## 2023-01-11 ENCOUNTER — APPOINTMENT (OUTPATIENT)
Dept: OTOLARYNGOLOGY | Facility: CLINIC | Age: 73
End: 2023-01-11
Payer: MEDICARE

## 2023-01-11 VITALS
HEIGHT: 63 IN | DIASTOLIC BLOOD PRESSURE: 82 MMHG | HEART RATE: 90 BPM | SYSTOLIC BLOOD PRESSURE: 137 MMHG | BODY MASS INDEX: 24.8 KG/M2 | TEMPERATURE: 96.3 F | WEIGHT: 140 LBS

## 2023-01-11 DIAGNOSIS — R43.0 ANOSMIA: ICD-10-CM

## 2023-01-11 PROCEDURE — 99213 OFFICE O/P EST LOW 20 MIN: CPT | Mod: 25

## 2023-01-11 PROCEDURE — 31231 NASAL ENDOSCOPY DX: CPT

## 2023-03-20 NOTE — ASSESSMENT
[FreeTextEntry1] : Ms. PERKINS has cough with phlegm for months due to postnasal drip from chronic sinusitis.\par Small polyps present in sinuses.\par \par --> start Xhance\par --> start Augmentin\par --> start medrol dose pack\par \par Return in 3 months

## 2023-03-20 NOTE — PHYSICAL EXAM
[FreeTextEntry1] : Mildly husky voice, stable. [de-identified] : Thyroid gland normal size, no palpable nodules. Both submandibular glands are small and hard; nontender. [Nasal Endoscopy Performed] : nasal endoscopy was performed, see procedure section for findings [Midline] : trachea located in midline position [Laryngoscopy Performed] : laryngoscopy was performed, see procedure section for findings [de-identified] : posterior pharyngeal wall is slightly erythematous [Normal] : no neck adenopathy Statement Selected

## 2023-03-20 NOTE — PROCEDURE
[FreeTextEntry6] : \par Indication: chronic sinusitis\par -Verbal consent was obtained from patient prior to exam. \par - Ron-Synephrine and lidocaine 2% spray applied to nose bilaterally.\par Nasal endoscopy was performed with 0 degree rigid scope.\par Findings: \par -- Septum was intact\par -- Bilateral inferior turbinates mildly edematous\par -RIGHT:\par  -- Polyp partially blocking maxillary ansotomy with clear mucus\par  -- Sphenoidotomy is open but patent\par  -- Remnant middle turbinate with polypoid tissue\par - LEFT: \par  -- Remnant middle turbinate enlarged\par  -- Polypoid change in ethmoid cavity and closing off left maxillary ansotomy \par  -- Ethmoid cavity with thicker mucus\par  -- I cannot see sphenoidotomy but drainage\par  [de-identified] : \par Indication:  Cough\par -Verbal consent was obtained from patient prior to procedure.\par -Ron-Synephrine and lidocaine 2% spray applied to the nasal cavities.\par Flexible laryngoscopy was performed via       nostril and revealed the following:\par   -- Nasopharynx had no mass or exudate.\par   -- Base of tongue was symmetric and not enlarged.\par   -- Cobblestoning of posterior pharyngeal wall. \par   -- Vallecula was clear\par   -- Epiglottis, both aryepiglottic folds and both false vocal folds were normal\par   -- Arytenoids both with mild edema and no erythema \par   -- True vocal folds were fully mobile and without lesions. \par   -- Post cricoid area was clear.\par   -- Interarytenoid edema was present.\par   -- No lesions seen in laryngopharynx\par \par The patient tolerated the procedure well.\par

## 2023-03-20 NOTE — HISTORY OF PRESENT ILLNESS
[de-identified] : Ms. JOSEPH presents for cough and phlegm. \par She is coughing day and night often for the past 2 months; cough is waking her from sleep. \par She feels she has a cold but cannot feel postnasal drip. Breathing through the nose is okay and she has no headaches.\par Asthma is a little better.\par Taking pantoprazole daily, she feels acid coming up sometimes. \par S/p endoscopic removal of recurrent left ethmoid polyp in office on December 19, 2017.\par S/p revision bilateral endoscopic sinus surgery (frontal, maxillary, total ethmoid, sphenoid) and partial middle turbinate resection bilateral on September 11, 2017.\par \par VISIT 4/13/2022\par Today she feels like she "has a cold" in her throat. She is spitting out clear mucus. She has runny nose.\par Asthma is controlled, no trouble breathing.\par She is doing budesonide rinses for sinuses On mometasone spray and Singulair. Xhance is not yet approved by her insurance company.\par She has spring tree pollen allergies.\par Dry eyes are much improved.\par She is on pantoprazole for reflux and has no heartburn.\par She is still having ear itching bilateral, no pain or d/c. She did not buy mineral oil.\par \par VISIT 3/25/2022\par Today, she reports that cough resolved after treatment but then returned about 2 weeks ago. Cough is not as bad as before and is coming from the throat; mucus clear with occasional yellow color. Has postnasal drip; no runny nose or congestion. She is able to smell and taste.\par  Asthma is controlled, no trouble breathing.\par She is doing budesonide rinses. She was prescribed Xhance inhaler previously, but she today said she didn’t receive it. \par On mometasone spray and Singulair.\par She has spring tree pollen allergies.\par Dry eyes are much improved..\par She is on pantoprazole for reflux and has no heartburn.\par She is still having ear itching bilateral, no pain or d/c. She could not find mineral oil.\par \par VISIT 01/05/2022\par Ms. JOSEPH reports persistent cough since November 2021. She still has clear phlegm from her throat.\par She is coughing all the time and is embarrassed that people think she is sick. 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 She received Xhance and is using it once at night.\par Asthma is controlled, no trouble breathing.\par Her eyes have been dry after blepharoplasty in May 2021.\par She is on pantoprazole for reflux and no symptoms.\par She is still having ear itching bilateral, no pain or d/c.\par \par VISIT 10/01/2021\par Ms. JOSEPH reports that cough is gone, phlegm improved with antibiotics. \par Nasal breathing is okay. Didnt use the budesonide nasal rinse since she couldn’t find sterile water.\par Salivary gland swelling is improving with triple antibiotic ointment that she's using.\par Asthma is controlled on medication.\par She is on pantoprazole for reflux and no symptoms.\par She is still having ear itching but not too much.\par Her eyes have been dry after blepharoplasty in May 2021.\par S/p endoscopic removal of recurrent left ethmoid polyp in office on December 19, 2017.\par S/P revision bilateral endoscopic sinus surgery (frontal, maxillary, total ethmoid, sphenoid) and partial middle turbinate resection bilateral on September 11, 2017. \par \par \par VISIT 6/25/2021\par Ms. Joseph still has phlegm and cough, due to asthma. Able to breathe through nose. No postnasal drip, rhinorrhea.\par Treated in Jan 2021 with antibiotics and oral steroids for 6 months of persistent productive cough due to sinusitis exacerbation --> relief. Treated in 3/2021 with cephalexin and Medrol Dosepak for return cough/phlegm.\par On mometasone spray and Singulair.\par Went to [place] in [place] x 2 months - couldn’t stay by the pool because had trouble breathing, thought to be from cleaning chemicals. She had to take albuterol 4 times in a row.\par S/p face/neck/eyebrow lift in  1 month ago.\par S/p endoscopic removal of recurrent left ethmoid polyp in office on December 19, 2017.\par S/P revision bilateral endoscopic sinus surgery (frontal, maxillary, total ethmoid, sphenoid) and partial middle turbinate resection bilateral on September 11, 2017. \par On pantoprazole for reflux and has no heartburn\par Hx of vocal cord polyps and chronic hoarseness.\par \par \par CULTURE Left maxillary sinus (01/05/2022)\par Numerous Citrobacter Koseri (resistant only to ampicillin alone)\par Rare Proteus mirabilis (susceptible to all tested meds)\par Normal respiratory santo \par \par \par CULTURE Left maxillary sinus (8/18/2021)\par Numerous Pseudomonas aeruginosa\par Moderate Proteus mirabilis\par Few Staphylococcus epidermidis \par

## 2023-04-28 ENCOUNTER — APPOINTMENT (OUTPATIENT)
Dept: OTOLARYNGOLOGY | Facility: CLINIC | Age: 73
End: 2023-04-28
Payer: MEDICARE

## 2023-04-28 VITALS
BODY MASS INDEX: 24.98 KG/M2 | WEIGHT: 141 LBS | DIASTOLIC BLOOD PRESSURE: 85 MMHG | HEART RATE: 72 BPM | TEMPERATURE: 96.6 F | SYSTOLIC BLOOD PRESSURE: 177 MMHG | HEIGHT: 63 IN

## 2023-04-28 DIAGNOSIS — Z87.898 PERSONAL HISTORY OF OTHER SPECIFIED CONDITIONS: ICD-10-CM

## 2023-04-28 DIAGNOSIS — K21.00 GASTRO-ESOPHAGEAL REFLUX DISEASE WITH ESOPHAGITIS, WITHOUT BLEEDING: ICD-10-CM

## 2023-04-28 DIAGNOSIS — J30.89 OTHER ALLERGIC RHINITIS: ICD-10-CM

## 2023-04-28 PROCEDURE — 99213 OFFICE O/P EST LOW 20 MIN: CPT | Mod: 25

## 2023-04-28 PROCEDURE — 31231 NASAL ENDOSCOPY DX: CPT

## 2023-04-28 RX ORDER — METHYLPREDNISOLONE 4 MG/1
4 TABLET ORAL
Qty: 1 | Refills: 0 | Status: COMPLETED | COMMUNITY
Start: 2022-10-06 | End: 2022-10-20

## 2023-04-28 RX ORDER — AMOXICILLIN 500 MG/1
CAPSULE ORAL
Refills: 0 | Status: COMPLETED | COMMUNITY
End: 2023-04-28

## 2023-04-28 RX ORDER — FLUTICASONE PROPIONATE 93 UG/1
93 SPRAY, METERED NASAL
Qty: 1 | Refills: 3 | Status: COMPLETED | COMMUNITY
Start: 2021-08-18 | End: 2023-04-28

## 2023-04-28 RX ORDER — IBUPROFEN 800 MG/1
TABLET, FILM COATED ORAL
Refills: 0 | Status: COMPLETED | COMMUNITY
End: 2023-04-28

## 2023-04-28 RX ORDER — AMOXICILLIN AND CLAVULANATE POTASSIUM 875; 125 MG/1; MG/1
875-125 TABLET, COATED ORAL
Qty: 20 | Refills: 0 | Status: COMPLETED | COMMUNITY
Start: 2022-10-06 | End: 2022-10-19

## 2023-04-28 RX ORDER — METRONIDAZOLE 500 MG/1
500 TABLET ORAL
Refills: 0 | Status: COMPLETED | COMMUNITY
End: 2023-04-28

## 2023-04-28 NOTE — HISTORY OF PRESENT ILLNESS
[de-identified] : Ms. JOSEPH reports that cough and phlegm returned a little over the past month.\par No facial pain, nasal congestion or headache.  Longstanding anosmia. No asthma exacerbation.\par Sinusitis exacerbation treated with amox-clav and Medrol in Oct 2022.\par Still did not get new Xhance inhaler.\par Infrequent heartburn on PPI.\par \par VISIT 10/06/2023\par Ms. JOSEPH presents for cough and phlegm. \par She is coughing day and night often for the past 2 months; cough is waking her from sleep. \par She feels she has a cold but cannot feel postnasal drip. Breathing through the nose is okay and she has no headaches.\par Asthma is a little better.\par Taking pantoprazole daily, she feels acid coming up sometimes. \par S/p endoscopic removal of recurrent left ethmoid polyp in office on December 19, 2017.\par S/p revision bilateral endoscopic sinus surgery (frontal, maxillary, total ethmoid, sphenoid) and partial middle turbinate resection bilateral on September 11, 2017.\par \par VISIT 4/13/2022\par Today she feels like she "has a cold" in her throat. She is spitting out clear mucus. She has runny nose.\par Asthma is controlled, no trouble breathing.\par She is doing budesonide rinses for sinuses On mometasone spray and Singulair. Xhance is not yet approved by her insurance company.\par She has spring tree pollen allergies.\par Dry eyes are much improved.\par She is on pantoprazole for reflux and has no heartburn.\par She is still having ear itching bilateral, no pain or d/c. She did not buy mineral oil.\par \par VISIT 3/25/2022\par Today, she reports that cough resolved after treatment but then returned about 2 weeks ago. Cough is not as bad as before and is coming from the throat; mucus clear with occasional yellow color. Has postnasal drip; no runny nose or congestion. She is able to smell and taste.\par  Asthma is controlled, no trouble breathing.\par She is doing budesonide rinses. She was prescribed Xhance inhaler previously, but she today said she didn’t receive it. \par On mometasone spray and Singulair.\par She has spring tree pollen allergies.\par Dry eyes are much improved..\par She is on pantoprazole for reflux and has no heartburn.\par She is still having ear itching bilateral, no pain or d/c. She could not find mineral oil.\par \par VISIT 01/05/2022\par Ms. JOSEPH reports persistent cough since November 2021. She still has clear phlegm from her throat.\par She is coughing all the time and is embarrassed that people think she is sick. 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 She received Xhance and is using it once at night.\par Asthma is controlled, no trouble breathing.\par Her eyes have been dry after blepharoplasty in May 2021.\par She is on pantoprazole for reflux and no symptoms.\par She is still having ear itching bilateral, no pain or d/c.\par \par VISIT 10/01/2021\par Ms. JOSEPH reports that cough is gone, phlegm improved with antibiotics. \par Nasal breathing is okay. Didnt use the budesonide nasal rinse since she couldn’t find sterile water.\par Salivary gland swelling is improving with triple antibiotic ointment that she's using.\par Asthma is controlled on medication.\par She is on pantoprazole for reflux and no symptoms.\par She is still having ear itching but not too much.\par Her eyes have been dry after blepharoplasty in May 2021.\par S/p endoscopic removal of recurrent left ethmoid polyp in office on December 19, 2017.\par S/P revision bilateral endoscopic sinus surgery (frontal, maxillary, total ethmoid, sphenoid) and partial middle turbinate resection bilateral on September 11, 2017. \par \par \par VISIT 6/25/2021\par Ms. Joseph still has phlegm and cough, due to asthma. Able to breathe through nose. No postnasal drip, rhinorrhea.\par Treated in Jan 2021 with antibiotics and oral steroids for 6 months of persistent productive cough due to sinusitis exacerbation --> relief. Treated in 3/2021 with cephalexin and Medrol Dosepak for return cough/phlegm.\par On mometasone spray and Singulair.\par Went to  [place] in [place] x 2 months - couldn’t stay by the pool because had trouble breathing, thought to be from cleaning chemicals. She had to take albuterol 4 times in a row.\par S/p face/neck/eyebrow lift in  1 month ago.\par S/p endoscopic removal of recurrent left ethmoid polyp in office on December 19, 2017.\par S/P revision bilateral endoscopic sinus surgery (frontal, maxillary, total ethmoid, sphenoid) and partial middle turbinate resection bilateral on September 11, 2017. \par On pantoprazole for reflux and has no heartburn\par Hx of vocal cord polyps and chronic hoarseness.\par \par \par CULTURE Left maxillary sinus (01/05/2022)\par Numerous Citrobacter Koseri (resistant only to ampicillin alone)\par Rare Proteus mirabilis (susceptible to all tested meds)\par Normal respiratory santo \par \par \par CULTURE Left maxillary sinus (8/18/2021)\par Numerous Pseudomonas aeruginosa\par Moderate Proteus mirabilis\par Few Staphylococcus epidermidis \par

## 2023-04-28 NOTE — PHYSICAL EXAM
[FreeTextEntry1] : loud raspy voice. [de-identified] : Dry, flaky skin in EACs; no cerumen impaction. [Nasal Endoscopy Performed] : nasal endoscopy was performed, see procedure section for findings [Midline] : trachea located in midline position [Normal] : no neck adenopathy

## 2023-04-28 NOTE — PROCEDURE
[FreeTextEntry6] : \par Indication: chronic sinusitis\par -Verbal consent was obtained from patient prior to exam. \par - Oxymetazoline 0.05% and lidocaine 2% spray applied to nose bilaterally.\par Nasal endoscopy was performed with 0 degree rigid scope.\par Findings: \par -- Septum was intact\par -- Bilateral inferior turbinates mildly edematous\par -RIGHT:\par  -- Smaller polyp along superior portion of maxillary ansotomy; clear mucus d/c\par  -- Sphenoidotomy is open but patent\par  -- Ethmoid cavity is open, with slightly polypoid lining along roof\par  -- Frontal opening not visible due to polypoid changes\par  -- Remnant middle turbinate with polypoid tissue\par - LEFT: \par  -- Remnant middle turbinate enlarged\par  -- Polypoid change in ethmoid cavity; thick clear mucus\par  -- Maxillary antrostomy is now open\par  -- Sphenoidotomy with clear drainage \par  -- Frontal not visible\par \par

## 2023-04-28 NOTE — ASSESSMENT
[FreeTextEntry1] : Ms. PERKINS has recurrent cough with phlegm, more likely  due to postnasal drip from chronic sinusitis than her reflux.\par Small polyps present in left >> right ethmoid & maxillary sinuses but much smaller after steroid treatment in October.\par \par --> need to restart Xhance inhaler.  Take mometasone for now. I do not want to have her on steroids again\par \par Return in 3 months

## 2023-05-03 PROBLEM — J30.89 ALLERGIC RHINITIS DUE TO OTHER ALLERGIC TRIGGER: Status: ACTIVE | Noted: 2017-01-27

## 2023-05-03 PROBLEM — Z87.898 HISTORY OF PERSISTENT COUGH: Status: RESOLVED | Noted: 2021-01-04 | Resolved: 2023-05-03

## 2023-05-03 RX ORDER — BUDESONIDE 1 MG/2ML
INHALANT ORAL
Refills: 0 | Status: DISCONTINUED | COMMUNITY
End: 2023-04-28

## 2023-05-03 NOTE — DATA REVIEWED
[de-identified] : CULTURE Left maxillary sinus (01/05/2022)\par Numerous Citrobacter Koseri (resistant only to ampicillin alone)\par Rare Proteus mirabilis (susceptible to all tested meds)\par Normal respiratory santo \par \par CULTURE Left maxillary sinus (8/18/2021)\par Numerous Pseudomonas aeruginosa\par Moderate Proteus mirabilis\par Few Staphylococcus epidermidis \par \par

## 2023-05-03 NOTE — CONSULT LETTER
[Dear  ___] : Dear  [unfilled], [Courtesy Letter:] : I had the pleasure of seeing your patient, [unfilled], in my office today. [Please see my note below.] : Please see my note below. [Consult Closing:] : Thank you very much for allowing me to participate in the care of this patient.  If you have any questions, please do not hesitate to contact me. [Sincerely,] : Sincerely, [FreeTextEntry2] : Hai Palomares M.D.\par [phone] [place] [address]\[place] [id number]  [FreeTextEntry1] : \par \par \par \par  [FreeTextEntry3] : \par Hetal Teresa MD \par Otolaryngology, Head and Neck Surgery \par Residency site \par

## 2023-05-03 NOTE — PHYSICAL EXAM
[Midline] : trachea located in midline position [FreeTextEntry1] : Mildly husky voice, stable. [de-identified] : Thyroid gland normal size, no palpable nodules.  [Nasal Endoscopy Performed] : nasal endoscopy was performed, see procedure section for findings [Normal] : no neck adenopathy

## 2023-05-03 NOTE — PROCEDURE
[FreeTextEntry6] : \par Indication: chronic sinusitis\par -Verbal consent was obtained from patient prior to exam. \par Nasal endoscopy was performed with 0 degree rigid scope.\par Findings: \par -- Septum was intact\par -- Bilateral inferior turbinates boggy and pale\par -RIGHT:\par  -- Sphenoidotomy is open but small\par  -- Ethmoid cavity is open, no polypoid or thickened mucosa\par  -- Maxillary antrostomy is open, no polyps\par  -- Frontal opening not visible \par  -- Remnant middle turbinate \par - LEFT: \par  -- Remnant middle turbinate \par  -- Maxillary antrostomy is now visibly open\par  -- Sphenoidotomy is open\par  -- Ethmoid cavity is open, no polypoid or thickened mucosa\par  -- Frontal sinusotomy is open\par

## 2023-05-03 NOTE — HISTORY OF PRESENT ILLNESS
[de-identified] : Ms. JOSEPH reports major improvement in runny nose, cough and phlegm, after drinking an herbal remedy from [place]. She can smell again.\par She used Xhance for 1 month, then stopped because the copay for refill was too high. Now she uses mometasone as needed.\par On asthma inhalers as needed\par Heartburn is improved on PPI.\par \par VISIT 1/11/2023\par Ms. JOSEPH reports that cough and phlegm returned a little over the past month.\par No facial pain, nasal congestion or headache. Longstanding anosmia. No asthma exacerbation.\par Sinusitis exacerbation treated with amox-clav and Medrol in Oct 2022.\par Still did not get new Xhance inhaler.\par Infrequent heartburn on PPI.\par \par VISIT 10/06/2023\par Ms. JOSEPH presents for cough and phlegm. \par She is coughing day and night often for the past 2 months; cough is waking her from sleep. \par She feels she has a cold but cannot feel postnasal drip. Breathing through the nose is okay and she has no headaches.\par Asthma is a little better.\par Taking pantoprazole daily, she feels acid coming up sometimes. \par S/p endoscopic removal of recurrent left ethmoid polyp in office on December 19, 2017.\par S/p revision bilateral endoscopic sinus surgery (frontal, maxillary, total ethmoid, sphenoid) and partial middle turbinate resection bilateral on September 11, 2017.\par \par VISIT 4/13/2022\par Today she feels like she "has a cold" in her throat. She is spitting out clear mucus. She has runny nose.\par Asthma is controlled, no trouble breathing.\par She is doing budesonide rinses for sinuses On mometasone spray and Singulair. Xhance is not yet approved by her insurance company.\par She has spring tree pollen allergies.\par Dry eyes are much improved.\par She is on pantoprazole for reflux and has no heartburn.\par She is still having ear itching bilateral, no pain or d/c. She did not buy mineral oil.\par \par VISIT 3/25/2022\par Today, she reports that cough resolved after treatment but then returned about 2 weeks ago. Cough is not as bad as before and is coming from the throat; mucus clear with occasional yellow color. Has postnasal drip; no runny nose or congestion. She is able to smell and taste.\par  Asthma is controlled, no trouble breathing.\par She is doing budesonide rinses. She was prescribed Xhance inhaler previously, but she today said she didn’t receive it. \par On mometasone spray and Singulair.\par She has spring tree pollen allergies.\par Dry eyes are much improved..\par She is on pantoprazole for reflux and has no heartburn.\par She is still having ear itching bilateral, no pain or d/c. She could not find mineral oil.\par \par VISIT 01/05/2022\par Ms. JOSEPH reports persistent cough since November 2021. She still has clear phlegm from her throat.\par She is coughing all the time and is embarrassed that people think she is sick. 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 She received Xhance and is using it once at night.\par Asthma is controlled, no trouble breathing.\par Her eyes have been dry after blepharoplasty in May 2021.\par She is on pantoprazole for reflux and no symptoms.\par She is still having ear itching bilateral, no pain or d/c.\par \par VISIT 10/01/2021\par Ms. JOSEPH reports that cough is gone, phlegm improved with antibiotics. \par Nasal breathing is okay. Didnt use the budesonide nasal rinse since she couldn’t find sterile water.\par Salivary gland swelling is improving with triple antibiotic ointment that she's using.\par Asthma is controlled on medication.\par She is on pantoprazole for reflux and no symptoms.\par She is still having ear itching but not too much.\par Her eyes have been dry after blepharoplasty in May 2021.\par S/p endoscopic removal of recurrent left ethmoid polyp in office on December 19, 2017.\par S/P revision bilateral endoscopic sinus surgery (frontal, maxillary, total ethmoid, sphenoid) and partial middle turbinate resection bilateral on September 11, 2017. \par \par VISIT 6/25/2021\par Ms. Joseph still has phlegm and cough, due to asthma. Able to breathe through nose. No postnasal drip, rhinorrhea.\par Treated in Jan 2021 with antibiotics and oral steroids for 6 months of persistent productive cough due to sinusitis exacerbation --> relief. Treated in 3/2021 with cephalexin and Medrol Dosepak for return cough/phlegm.\par On mometasone spray and Singulair.\par Went to  on in April x 2 months - couldn’t stay by the pool because had trouble breathing, thought to be from cleaning chemicals. She had to take albuterol 4 times in a row.\par S/p face/neck/eyebrow lift in  1 month ago.\par S/p endoscopic removal of recurrent left ethmoid polyp in office on December 19, 2017.\par S/P revision bilateral endoscopic sinus surgery (frontal, maxillary, total ethmoid, sphenoid) and partial middle turbinate resection bilateral on September 11, 2017. \par On pantoprazole for reflux and has no heartburn\par Hx of vocal cord polyps and chronic hoarseness.\par

## 2023-05-03 NOTE — ASSESSMENT
[FreeTextEntry1] : Ms. PERKINS has resolution of recurrent cough with phlegm,  due to postnasal drip from chronic sinusitis, after herbal medication from [place] (contains steroids?)\par I do not see any polyps or mucosal inflammation on nasal endoscopy today.  All sinus openings, except for right frontal, are visible and patent.\par She had had improvement on Xhance but copay cost is prohibitive for her.\par Her reflux is controlled on PPI.\par \par -->  Take mometasone at least once daily\par --> continue montelukast\par \par Return in 6 months

## 2023-06-08 ENCOUNTER — NON-APPOINTMENT (OUTPATIENT)
Age: 73
End: 2023-06-08

## 2023-06-14 ENCOUNTER — APPOINTMENT (OUTPATIENT)
Dept: BREAST CENTER | Facility: CLINIC | Age: 73
End: 2023-06-14

## 2023-06-14 ENCOUNTER — NON-APPOINTMENT (OUTPATIENT)
Age: 73
End: 2023-06-14

## 2023-07-10 NOTE — ASU PREOP CHECKLIST - AS BP NONINV SITE
07/10/23    [facility]   [address] [place] [address]  [phone]                             Regarding Patient:  Marti Champagne  [address] [place] [id number]    To Whom It Concern:    This is to certify Marti Champagne was evaluated with Anna Vitale MD on 07/10/23.  Please excuse Anoop Robert from work as he needed to accompany his significant other to her appointment.  If you have any questions, please call my office at [phone].    Sincerely          ____________________________  Anna Vitale MD                   right upper arm

## 2023-08-18 ENCOUNTER — APPOINTMENT (OUTPATIENT)
Dept: BREAST CENTER | Facility: CLINIC | Age: 73
End: 2023-08-18
Payer: MEDICARE

## 2023-08-18 VITALS
HEIGHT: 63 IN | BODY MASS INDEX: 26.75 KG/M2 | WEIGHT: 151 LBS | DIASTOLIC BLOOD PRESSURE: 77 MMHG | SYSTOLIC BLOOD PRESSURE: 159 MMHG | HEART RATE: 74 BPM

## 2023-08-18 DIAGNOSIS — Z85.3 PERSONAL HISTORY OF MALIGNANT NEOPLASM OF BREAST: ICD-10-CM

## 2023-08-18 DIAGNOSIS — R20.0 PAIN IN LEFT ARM: ICD-10-CM

## 2023-08-18 DIAGNOSIS — M79.602 PAIN IN LEFT ARM: ICD-10-CM

## 2023-08-18 PROCEDURE — 99213 OFFICE O/P EST LOW 20 MIN: CPT

## 2023-08-18 NOTE — HISTORY OF PRESENT ILLNESS
[FreeTextEntry1] : Patient is a 74 yo F who presents today for breast cancer surveillance. She has hx of B/l TM & SLNB 5/2020 (age 70) w/ reconstruction ([place]). 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 (microcal 5cm on preop MG) Denies family history of breast or ovarian cancer. No genetic testing. Patient denies palpable masses or skin changes bilaterally. Patient complaints of intermittent LUE pain and weakness that has worsened over the last 3 weeks.  TNM Staging: R pT1mi, pN0. L pTis, pN0  9/15/17: L MG & US- MG- 0.7cm LIQ stable mass, 0.7cm 9:00 stable mass w/ coarse calcs possibly degenerating FA; US previously seen 0.8cm mass 7:00 8FN w/ new irregularity rec US core bx, stable 0.8cm 7:00 8FN mass (bx vs f/u), 0.6cm 9:00 6FN mass likely stable & c/w fibroadenoma on MG ( bx vs f/u), BIRADS 4 9/25/17: L US core bx of 7:00 8FN mass path- atypical papillary lesion, concordant, hourglass marker 11/16/17: R MG- stable benign calcs, stable UOQ LN, BIRADS 2 12/12/17: L US core bx of second 7:00 8FN mass path- IDP, marker, concordant; 9:00 6FN mass path- IDP, R shaped marker, concordant 11/20/18: B/l MG- dense, stable benign calcs, R- UOQ stable benign LN, L- stable UOQ post sx changes, 0.4cm UOQ 6FN round mass rec add imaging, BIRADS 0 12/4/18: L MG & B/l US- MG- UOQ persistent asymmetric density; US- L- 0.6cm 1:00 2FN benign cyst c/w nodular density on MG, R- benign cyst, BIRADS 2 11/22/19: B/l MG & US- MG- R- 0.6cm LIQ 6FN new linear branching calcs rec R Dx MG, UOQ LN slightly larger than 2016, L- stable UOQ postsx changes; US- R- 0.7cm 7:00 5FN benign hypoechoic mass, 1cm 10:00 10FN intramammary LN c/w LN on MG w/ mild increase in cortical thickening rec targeted R US, L-several cysts, 0.5cm 2:00 6FN dystrophic calc, BIRADS 0 2/25/20: R MG & US- MG- LIQ 6FN persistent cluster of linear branching calcs rec stereo bx; US- 0.8cm 10:00 10FN intramammary LN wnl rec 6 mth f/u, BIRADS 4 3/7/20: R stereo bx of 3:00 calcs - DCIS w/ extensive necrosis, ER/IL neg, jessica/cork marker, concordant, 5cm 5/4/20: B/l TM & B/l SNB- 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 L DCIS (intermediate nuclear grade), ADH, radial scar, negate margins, 0/1LN. right microinvasive IDC 0/1 lymph node, left DCIS 0/1 lymph node. ER- (0%), IL- (0%), HER2+ (2+)

## 2023-08-18 NOTE — PHYSICAL EXAM
[de-identified] : B/l chest wall/axilla/supraclavicular area: No evidence of recurrence\par IMPLANTS!!

## 2023-08-20 ENCOUNTER — EMERGENCY (EMERGENCY)
Facility: HOSPITAL | Age: 73
LOS: 1 days | Discharge: HOME | End: 2023-08-20
Attending: EMERGENCY MEDICINE | Admitting: EMERGENCY MEDICINE
Payer: MEDICARE

## 2023-08-20 VITALS
TEMPERATURE: 98 F | HEART RATE: 73 BPM | SYSTOLIC BLOOD PRESSURE: 170 MMHG | OXYGEN SATURATION: 98 % | DIASTOLIC BLOOD PRESSURE: 80 MMHG | RESPIRATION RATE: 18 BRPM

## 2023-08-20 DIAGNOSIS — Z90.710 ACQUIRED ABSENCE OF BOTH CERVIX AND UTERUS: ICD-10-CM

## 2023-08-20 DIAGNOSIS — Z98.89 OTHER SPECIFIED POSTPROCEDURAL STATES: Chronic | ICD-10-CM

## 2023-08-20 DIAGNOSIS — R60.0 LOCALIZED EDEMA: ICD-10-CM
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
## 2023-08-20 DIAGNOSIS — Z87.09 PERSONAL HISTORY OF OTHER DISEASES OF THE RESPIRATORY SYSTEM: ICD-10-CM

## 2023-08-20 DIAGNOSIS — Z86.018 PERSONAL HISTORY OF OTHER BENIGN NEOPLASM: ICD-10-CM

## 2023-08-20 DIAGNOSIS — Z90.710 ACQUIRED ABSENCE OF BOTH CERVIX AND UTERUS: Chronic | ICD-10-CM

## 2023-08-20 DIAGNOSIS — M25.512 PAIN IN LEFT SHOULDER: ICD-10-CM

## 2023-08-20 DIAGNOSIS — M79.602 PAIN IN LEFT ARM: ICD-10-CM

## 2023-08-20 DIAGNOSIS — E78.5 HYPERLIPIDEMIA, UNSPECIFIED: ICD-10-CM

## 2023-08-20 DIAGNOSIS — Z87.19 PERSONAL HISTORY OF OTHER DISEASES OF THE DIGESTIVE SYSTEM: ICD-10-CM

## 2023-08-20 DIAGNOSIS — S40.812A ABRASION OF LEFT UPPER ARM, INITIAL ENCOUNTER: ICD-10-CM
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
## 2023-08-20 PROCEDURE — 99284 EMERGENCY DEPT VISIT MOD MDM: CPT | Mod: 25

## 2023-08-20 PROCEDURE — 73200 CT UPPER EXTREMITY W/O DYE: CPT | Mod: 26,LT,MA

## 2023-08-20 PROCEDURE — 99284 EMERGENCY DEPT VISIT MOD MDM: CPT

## 2023-08-20 PROCEDURE — 73200 CT UPPER EXTREMITY W/O DYE: CPT | Mod: MA

## 2023-08-20 RX ORDER — LIDOCAINE 4 G/100G
1 CREAM TOPICAL ONCE
Refills: 0 | Status: COMPLETED | OUTPATIENT
Start: 2023-08-20 | End: 2023-08-20

## 2023-08-20 RX ORDER — OXYCODONE AND ACETAMINOPHEN 5; 325 MG/1; MG/1
1 TABLET ORAL
Qty: 8 | Refills: 0
Start: 2023-08-20

## 2023-08-20 RX ADMIN — LIDOCAINE 1 PATCH: 4 CREAM TOPICAL at 11:11

## 2023-08-20 NOTE — ED PROVIDER NOTE - OBJECTIVE STATEMENT
Pt complains of pain in her left arm that began 3 wks ago. Pain originates in her shoulder and radiates down her arm to her 2nd finger (index finger). Pt has swelling at L elbow and her arm is tender to palpation. Pt denies numbness, fever, and any other symptoms. Pt states pain just started and she is unable to relate to a moment of injury. Pt has healing wound on L upper arm from a "Chinese medicine" patch she tried to use for pain. The patch did not help  · Presenting Symptoms: JOINT SWELLING, PAIN, TENDERNESS  · Negative Findings: no back pain, no bruising, no deformity, no fever, no numbness, no stiffness, no tingling  · Location: shoulder  · Laterality: left  · Radiation: entire L arm to 2nd finger  · Timing: gradual onset  · Duration: week(s)  · Severity: PAIN SCALE 10 OF 10.  · Context: unknown

## 2023-08-20 NOTE — ED ADULT NURSE NOTE - NSFALLUNIVINTERV_ED_ALL_ED
Bed/Stretcher in lowest position, wheels locked, appropriate side rails in place/Call bell, personal items and telephone in reach/Instruct patient to call for assistance before getting out of bed/chair/stretcher/Non-slip footwear applied when patient is off stretcher/[place] to call system/Physically safe environment - no spills, clutter or unnecessary equipment/Purposeful proactive rounding/Room/bathroom lighting operational, light cord in reach

## 2023-08-20 NOTE — ED PROVIDER NOTE - PHYSICAL EXAMINATION
CONSTITUTIONAL: Well-appearing; well-nourished; in no apparent distress.   HEAD: Normocephalic; atraumatic.   EYES:  conjunctiva and sclera clear  ENT: normal nose; no rhinorrhea;  NECK: Supple; full ROM  RESPIRATORY: Breathing easily; no resp difficulty  EXT: No cyanosis or edema; L shoulder w/ diffuse tenderness, mild edema, no erythema/induration/warmth. Full ROM of shoulder intact. small area of irritation in shape of square near bicep w/ small skin abrasion 1cm, no surrounding erythema/warmth/induration  SKIN: Normal for age and race; warm; dry; good turgor; no apparent lesions or rash.   NEURO: A & O x 3; face symmetric; grossly unremarkable.   PSYCHOLOGICAL: The patient’s mood and manner are appropriate.

## 2023-08-20 NOTE — ED PROVIDER NOTE - NSICDXPASTMEDICALHX_GEN_ALL_CORE_FT
PAST MEDICAL HISTORY:  Asthma     Breast cyst     Environmental allergies     GERD without esophagitis     Hyperlipidemia     Prediabetes     Sinusitis

## 2023-08-20 NOTE — ED PROVIDER NOTE - NSFOLLOWUPINSTRUCTIONS_ED_ALL_ED_FT
[facility] (Thursday afternoons) - call [phone] to schedule    Bursitis  Front, or anterior, view of the knee with a close-up of an inflamed bursa.  Bursitis is inflammation and irritation of a bursa, which is a small fluid-filled sac that cushions and protects the joints. These sacs are located between bones and muscles, bones and muscle attachments, or bones and skin areas that are next to bones. A bursa protects those structures from the wear and tear that results from frequent movement. If the bursa becomes irritated, it can fill with extra fluid. Bursitis is most common near joints, especially the knees, elbows, hips, and shoulders.    What are the causes?  This condition may be caused by:  An injury like a direct hit to a joint area, such as falling on your knee or elbow.  Overuse of a joint (repetitive stress).  Infection. This can happen if bacteria get into a bursa through a cut or scrape near a joint.  Diseases that cause joint inflammation, such as gout and rheumatoid arthritis.  What increases the risk?  You are more likely to develop this condition if:  You have a job or hobby that involves a lot of repetitive stress on your joints.  You have a condition that weakens your body's defense system (immune system), such as diabetes, cancer, or HIV.  You do any of these often:  Lift and reach overhead.  Kneel or lean on hard surfaces.  Participate in physical activities that include repetitive motion, like running or walking.  What are the signs or symptoms?  Common symptoms of this condition include:  Pain that gets worse when you move the affected body part or use it to support your body weight.  Inflammation.  Stiffness.  Other symptoms include:  Redness.  Swelling.  Tenderness.  Warmth.  Pain that continues after rest.  Fever or chills. These may occur in bursitis that is caused by infection.  How is this diagnosed?  This condition may be diagnosed based on:  Your medical history and a physical exam.  Imaging tests, such as an MRI or X-ray.  Draining fluid from the bursa to test it for infection or gout.  Blood tests to rule out other causes of inflammation.  How is this treated?  This condition can usually be treated at home with rest, ice, applying pressure (compression), and raising the body part that is affected (elevation). This is called RICE therapy. For mild bursitis, RICE therapy may be all you need. Other treatments may include:  Over-the-counter medicines to relieve pain and inflammation.  Injections of anti-inflammatory medicines. These medicines may be injected into and around the area of bursitis.  Draining of fluid from the bursa to relieve pain and improve movement.  Antibiotic medicine if there is an infection.  Using a splint, brace, elastic wrap, pads, or walking aid, such as a cane.  Physical or occupational therapy if you continue to have pain or limited movement. Your physical or occupational therapist can help determine what may have caused or contributed to the bursitis. This will help avoid further episodes.  Surgery to remove a damaged or infected bursa. This may be needed if other treatments have not worked.  Follow these instructions at home:  Medicines    Take over-the-counter and prescription medicines only as told by your health care provider.  If you were prescribed an antibiotic medicine, take it as told by your health care provider. Do not stop using the antibiotic even if you start to feel better.  Managing pain, stiffness, and swelling    Bag of ice on a towel on the skin.  A heating pad being used on the affected joint.  Raise (elevate) the injured area above the level of your heart while you are sitting or lying down.  If directed, put ice on the affected area. To do this:  Put ice in a plastic bag.  Place a towel between your skin and the bag, or between your splint or brace and the bag.  Leave the ice on for 20 minutes, 2–3 times a day.  Remove the ice if your skin turns bright red. This is very important. If you cannot feel pain, heat, or cold, you have a greater risk of damage to the area.  If directed, apply heat to the affected area as often as told by your health care provider. Use the heat source that your health care provider recommends, such as a moist heat pack or a heating pad.  Place a towel between your skin and the heat source.  Leave the heat on for 20–30 minutes.  Remove the heat if your skin turns bright red. This is especially important if you are unable to feel pain, heat, or cold. You may have a greater risk of getting burned.  General instructions    Rest the affected area as told by your health care provider.  Avoid activities that make pain worse.  Use splints, braces, pads, elastic wrap, or walking aids as told by your health care provider.  Keep all follow-up visits. This is important.  Preventing future episodes    Wear knee pads if you kneel often.  Wear sturdy running or walking shoes that fit well.  Take breaks regularly from repetitive activity.  Warm up by stretching before doing any activity that takes a lot of effort.  Maintain a healthy weight or lose weight as recommended by your health care provider. If you need help doing this, ask your health care provider.  Exercise regularly. Start any new physical activity gradually.  Work with your physical or occupational therapist and your health care provider to help determine what caused the bursitis.  Contact a health care provider if:  You have a fever or chills.  Your symptoms do not get better with treatment.  You have pain or swelling that gets worse, or it goes away and then comes back.  You have pus draining from the affected area.  You have redness around the affected area.  The affected area is warm to the touch.  Summary  Bursitis is inflammation and irritation of a bursa, which is a small fluid-filled sac that cushions and protects the joints.  Rest the affected area as told by your health care provider.  Avoid activities that make pain worse.  This condition can usually be treated at home with rest, ice, applying pressure (compression), and raising the body part that is affected (elevation). This is called RICE therapy. Please call orthopedics on Monday to set up an appointment to see a specialist     Freddie Dennis  Orthopaedic Surgery  [address] [place] [address], [address]  [place] [address]  Phone: [phone]    If he does not take your insurance, you can come to our orthopedics clinic  [facility] (Thursday afternoons) - call [phone] to schedule    Possible Bursitis  Front, or anterior, view of the knee with a close-up of an inflamed bursa.  Bursitis is inflammation and irritation of a bursa, which is a small fluid-filled sac that cushions and protects the joints. These sacs are located between bones and muscles, bones and muscle attachments, or bones and skin areas that are next to bones. A bursa protects those structures from the wear and tear that results from frequent movement. If the bursa becomes irritated, it can fill with extra fluid. Bursitis is most common near joints, especially the knees, elbows, hips, and shoulders.    What are the causes?  This condition may be caused by:  An injury like a direct hit to a joint area, such as falling on your knee or elbow.  Overuse of a joint (repetitive stress).  Infection. This can happen if bacteria get into a bursa through a cut or scrape near a joint.  Diseases that cause joint inflammation, such as gout and rheumatoid arthritis.  What increases the risk?  You are more likely to develop this condition if:  You have a job or hobby that involves a lot of repetitive stress on your joints.  You have a condition that weakens your body's defense system (immune system), such as diabetes, cancer, or HIV.  You do any of these often:  Lift and reach overhead.  Kneel or lean on hard surfaces.  Participate in physical activities that include repetitive motion, like running or walking.  What are the signs or symptoms?  Common symptoms of this condition include:  Pain that gets worse when you move the affected body part or use it to support your body weight.  Inflammation.  Stiffness.  Other symptoms include:  Redness.  Swelling.  Tenderness.  Warmth.  Pain that continues after rest.  Fever or chills. These may occur in bursitis that is caused by infection.  How is this diagnosed?  This condition may be diagnosed based on:  Your medical history and a physical exam.  Imaging tests, such as an MRI or X-ray.  Draining fluid from the bursa to test it for infection or gout.  Blood tests to rule out other causes of inflammation.  How is this treated?  This condition can usually be treated at home with rest, ice, applying pressure (compression), and raising the body part that is affected (elevation). This is called RICE therapy. For mild bursitis, RICE therapy may be all you need. Other treatments may include:  Over-the-counter medicines to relieve pain and inflammation.  Injections of anti-inflammatory medicines. These medicines may be injected into and around the area of bursitis.  Draining of fluid from the bursa to relieve pain and improve movement.  Antibiotic medicine if there is an infection.  Using a splint, brace, elastic wrap, pads, or walking aid, such as a cane.  Physical or occupational therapy if you continue to have pain or limited movement. Your physical or occupational therapist can help determine what may have caused or contributed to the bursitis. This will help avoid further episodes.  Surgery to remove a damaged or infected bursa. This may be needed if other treatments have not worked.  Follow these instructions at home:  Medicines    Take over-the-counter and prescription medicines only as told by your health care provider.  If you were prescribed an antibiotic medicine, take it as told by your health care provider. Do not stop using the antibiotic even if you start to feel better.  Managing pain, stiffness, and swelling    Bag of ice on a towel on the skin.  A heating pad being used on the affected joint.  Raise (elevate) the injured area above the level of your heart while you are sitting or lying down.  If directed, put ice on the affected area. To do this:  Put ice in a plastic bag.  Place a towel between your skin and the bag, or between your splint or brace and the bag.  Leave the ice on for 20 minutes, 2–3 times a day.  Remove the ice if your skin turns bright red. This is very important. If you cannot feel pain, heat, or cold, you have a greater risk of damage to the area.  If directed, apply heat to the affected area as often as told by your health care provider. Use the heat source that your health care provider recommends, such as a moist heat pack or a heating pad.  Place a towel between your skin and the heat source.  Leave the heat on for 20–30 minutes.  Remove the heat if your skin turns bright red. This is especially important if you are unable to feel pain, heat, or cold. You may have a greater risk of getting burned.  General instructions    Rest the affected area as told by your health care provider.  Avoid activities that make pain worse.  Use splints, braces, pads, elastic wrap, or walking aids as told by your health care provider.  Keep all follow-up visits. This is important.  Preventing future episodes    Wear knee pads if you kneel often.  Wear sturdy running or walking shoes that fit well.  Take breaks regularly from repetitive activity.  Warm up by stretching before doing any activity that takes a lot of effort.  Maintain a healthy weight or lose weight as recommended by your health care provider. If you need help doing this, ask your health care provider.  Exercise regularly. Start any new physical activity gradually.  Work with your physical or occupational therapist and your health care provider to help determine what caused the bursitis.  Contact a health care provider if:  You have a fever or chills.  Your symptoms do not get better with treatment.  You have pain or swelling that gets worse, or it goes away and then comes back.  You have pus draining from the affected area.  You have redness around the affected area.  The affected area is warm to the touch.  Summary  Bursitis is inflammation and irritation of a bursa, which is a small fluid-filled sac that cushions and protects the joints.  Rest the affected area as told by your health care provider.  Avoid activities that make pain worse.  This condition can usually be treated at home with rest, ice, applying pressure (compression), and raising the body part that is affected (elevation). This is called RICE therapy.

## 2023-08-20 NOTE — ED PROVIDER NOTE - NSICDXPASTSURGICALHX_GEN_ALL_CORE_FT
PAST SURGICAL HISTORY:  H/O reduction mammoplasty     H/O total hysterectomy     History of colonoscopy

## 2023-08-20 NOTE — ED PROVIDER NOTE - CARE PROVIDER_API CALL
Freddie Dennis  Orthopaedic Surgery  [address] [place] [address], [address]  [place] [address]  Phone: [phone]  Fax: [phone]  Follow Up Time:

## 2023-08-20 NOTE — ED ADULT NURSE NOTE - OBJECTIVE STATEMENT
Pt complains of pain in her left arm that began 3 wks ago. Pain originates in her shoulder and radiates down her arm to her 2nd finger (index finger). Pt has swelling at L elbow and her arm is tender to palpation. Pt denies numbness, fever, and any other symptoms. Pt states pain just started and she is unable to relate to a moment of injury. Pt has healing wound on L upper arm from a "Chinese medicine" patch she tried to use for pain. The patch did not help

## 2023-08-20 NOTE — ED ADULT TRIAGE NOTE - CHIEF COMPLAINT QUOTE
Pt co L shoulder/ arm pain x3 weeks. States, "this happens to me sometimes, but I didn't sleep because it hurts and feels swollen." EKG done. Denies CP, SOB, HA, dizziness.

## 2023-08-20 NOTE — ED PROVIDER NOTE - CLINICAL SUMMARY MEDICAL DECISION MAKING FREE TEXT BOX
pain most likely MSK based on exam  CT done to r/o effusion, occult fracture and neg  will send to specialist for further evaluation

## 2023-08-20 NOTE — ED PROVIDER NOTE - PATIENT PORTAL LINK FT
You can access the FollowMyHealth Patient Portal offered by [facility] by registering at the following website: [email or web address]. By joining [facility] FollowMyHealth portal, you will also be able to view your health information using other applications (apps) compatible with our system.

## 2023-08-24 ENCOUNTER — APPOINTMENT (OUTPATIENT)
Dept: PHYSICAL MEDICINE AND REHAB | Facility: CLINIC | Age: 73
End: 2023-08-24
Payer: MEDICARE

## 2023-08-24 VITALS
OXYGEN SATURATION: 96 % | BODY MASS INDEX: 26.75 KG/M2 | HEART RATE: 86 BPM | HEIGHT: 63 IN | DIASTOLIC BLOOD PRESSURE: 79 MMHG | SYSTOLIC BLOOD PRESSURE: 173 MMHG | WEIGHT: 151 LBS

## 2023-08-24 PROCEDURE — 99205 OFFICE O/P NEW HI 60 MIN: CPT

## 2023-08-24 NOTE — PHYSICAL EXAM
[FreeTextEntry1] : Gen: Patient is A&O x 3, NAD HEENT: EOMI, hearing grossly normal Resp: regular, non-labored CV: extremities well perfused Abd: No visible distension   Inspection: Increased thoracic kyphosis. No periscapular atrophy.  Spine: No tenderness to palpation of midline structures Lymph: negative Stemmer's sign Cervical ROM: Full functional rage. pain with rotation to left Extremities:      Inspection: Normal bulk with no evidence of atrophy      ROM: Full functional ROM in extremities      Palpation: No pain with palpation of joints and soft tissues. No pain with palpation along clavicle, GHJ, AC joint. Cranial: No facial asymmetry, facial sensation intact, EOMI, tongue midline, shoulder shrug intact Sensation: intact to light touch Reflexes: brisk throughout; + Lucas's bilaterally, clonus L foot Strength:       LEFT UE - ShAB 5/5, Walter, 5/5, Roxana 5/5, EF 5/5, EE 5/5, WE 5/5,  5/5       RIGHT UE - ShAB 5/5, Walter 5/5, Roxana 5/5, EF 5/5, EE 5/5, WE 5/5,  5/5       LEFT LE - HF 5/5, KE 5/5, DF 5/5, PF 5/5       RIGHT LE - HF 5/5, KE 5/5, DF 5/5, PF 5/5 Special tests:      Spine: (+) Spurling sign - pain radiated to shoulder, (+) Lucas's sign      Shoulder: (-) Neer's, (-) Hawkin's Gait: normal step length, non-antalgic, well-compensated

## 2023-08-24 NOTE — REVIEW OF SYSTEMS
[Chest Pain] : no chest pain [Palpitations] : no palpitations [Shortness Of Breath] : no shortness of breath [Constipation] : constipation [Incontinence] : no incontinence [Joint Pain] : joint pain [Joint Stiffness] : joint stiffness [Muscle Pain] : muscle pain [Skin Rash] : no skin rash [Difficulty Walking] : no difficulty walking [FreeTextEntry3] : no visual changes

## 2023-08-24 NOTE — HISTORY OF PRESENT ILLNESS
[FreeTextEntry1] :   HPI: 73-year-old female with a history of right-sided breast cancer s/p bilateral total mastectomies with SLNB and reconstruction with implants in , currently on surveillance. Referred to rehab medicine for LUE pain and weakness.  Pertinent PMHx of: Asthma   --------------------------------------------------- 2023 Initial Evaluation -- Presents today with severe left upper extremity pain. Notes pain started about 1 month ago and worsening. Today rates pain as 10/10 and has difficulty finding a comfortable position. Currently using a sling or elevating her arm above her head for relief. She describes pain as constant, achy, and burning. Pain starts left side of her neck and radiates down to her elbow, occasionally down to fingertips. She has difficulty sleeping at night due to pain. Moving her arm makes the pain worse. She was seen in [facility] for this pain a few weeks ago and reports she was given pain medication at the time. She was seen at [facility] ED on  due to arm pain where they performed CT of her upper extremity which was negative for lesions or fracture.  She denies gait/balance issues, or bowel/bladder incontinence, denies lower back pain.  --------------------------------------------------- Relevant imaging/ work up reviewed: - ED course at [facility]  - CT left upper extremity 23 -- No acute fracture or dislocation. Mild AC joint arthritis, partially visualized cervical and thoracic spine with moderate degenerative changes. No GH dislocation. --------------------------------------------------- Functional Performance Status: - KPS: 90 - ECO - ADLs/ iADLs: some difficulty due to pain - Mobility: independent  --------------------------------------------------- Providers: Surg: Noemi Smith; ROB Reza.

## 2023-08-24 NOTE — ASSESSMENT
[FreeTextEntry1] :   Assessment: 73-year-old female with a history of right-sided breast cancer s/p bilateral total mastectomies with SLNB and reconstruction with implants, currently on surveillance. Referred to rehab medicine for LUE pain and weakness.   Problems / Impression: 1. Left upper extremity pain -- severe subacute onset. Suspect cervical radiculitis in the setting of known degenerative spine. No red flag symptoms, although exam significant for brisk reflexes. Given history of breast cancer, in favor of work up to rule out malignancy.   Plan/ Recommendations: - Discussed current symptoms, potential etiologies, and management to date, and anticipated clinical course. - Reviewed prior Surg onc notes as well as relevant imaging mentioned above - Imaging/ Work-up:      Ordered MRI cervical spine w/ and w/o to further evaluate for radiculopathy and assess for malignant lesions - Therapy:      Pending work up above and pain control. - Medications:      Prescribed Medrol dosepak due to severity of pain. Encouraged taking with Protonix for GI protection. Discussed rationale and potential side effects.  - Educated on red flag signs/ symptoms for which she would need to seek immediate medical attention including, but not limited to, progressive weakness, new or worsening pain, new or worsening sensory change, balance difficulty, and/or bowel/bladder dysfunction.  - Follow-up: 2-3 weeks. Will call sooner with results of MRI - Communicated with referring team plan of care   The patient expressed verbal understanding and is in agreement with the plan of care. All of the patient's questions and concerns were addressed during today's visit.

## 2023-08-31 ENCOUNTER — OUTPATIENT (OUTPATIENT)
Dept: OUTPATIENT SERVICES | Facility: HOSPITAL | Age: 73
LOS: 1 days | End: 2023-08-31

## 2023-08-31 ENCOUNTER — APPOINTMENT (OUTPATIENT)
Dept: MRI IMAGING | Facility: CLINIC | Age: 73
End: 2023-08-31
Payer: MEDICARE

## 2023-08-31 DIAGNOSIS — Z98.89 OTHER SPECIFIED POSTPROCEDURAL STATES: Chronic | ICD-10-CM

## 2023-08-31 PROCEDURE — 72156 MRI NECK SPINE W/O & W/DYE: CPT | Mod: 26

## 2023-09-01 ENCOUNTER — APPOINTMENT (OUTPATIENT)
Dept: PHYSICAL MEDICINE AND REHAB | Facility: CLINIC | Age: 73
End: 2023-09-01
Payer: MEDICARE

## 2023-09-01 VITALS
OXYGEN SATURATION: 98 % | HEIGHT: 63 IN | BODY MASS INDEX: 26.75 KG/M2 | WEIGHT: 151 LBS | DIASTOLIC BLOOD PRESSURE: 88 MMHG | SYSTOLIC BLOOD PRESSURE: 152 MMHG | HEART RATE: 84 BPM

## 2023-09-01 DIAGNOSIS — M54.2 CERVICALGIA: ICD-10-CM

## 2023-09-01 DIAGNOSIS — M54.12 RADICULOPATHY, CERVICAL REGION: ICD-10-CM

## 2023-09-01 DIAGNOSIS — Z85.3 PERSONAL HISTORY OF MALIGNANT NEOPLASM OF BREAST: ICD-10-CM

## 2023-09-01 DIAGNOSIS — M79.2 NEURALGIA AND NEURITIS, UNSPECIFIED: ICD-10-CM

## 2023-09-01 PROCEDURE — 99214 OFFICE O/P EST MOD 30 MIN: CPT

## 2023-09-01 NOTE — PHYSICAL EXAM
[FreeTextEntry1] : Gen: Patient is A&O x 3, NAD HEENT: EOMI, hearing grossly normal Resp: regular, non-labored CV: extremities well perfused Abd: No visible distension   Inspection: Increased thoracic kyphosis. No periscapular atrophy.  Spine: No tenderness to palpation of midline structures Cervical ROM: Full functional rage. discomfort with rotation to left localized to upper trapezius/cervical paraspinals  Extremities:      Inspection: Normal bulk with no evidence of atrophy      ROM: Full functional ROM in extremities      Palpation: No pain with palpation of joints and soft tissues. No pain with palpation along clavicle, GHJ, AC joint. Cranial: No facial asymmetry, facial sensation intact, EOMI, tongue midline, shoulder shrug intact Sensation: intact to light touch Reflexes: brisk throughout; + Lucas's on left Strength:       LEFT UE - ShAB 5/5, Walter, 5/5, Roxana 5/5, EF 5/5, EE 5/5, WE 5/5,  5/5       RIGHT UE - ShAB 5/5, Walter 5/5, Roxana 5/5, EF 5/5, EE 5/5, WE 5/5,  5/5       LEFT LE - HF 5/5, KE 5/5, DF 5/5, PF 5/5       RIGHT LE - HF 5/5, KE 5/5, DF 5/5, PF 5/5 Special tests:      Spine: (-) Spurling sign - pain localized to neck, (+) Lucas's sign      Shoulder: (-) Neer's, (-) Hawkin's Gait: normal step length, non-antalgic, well-compensated

## 2023-09-01 NOTE — ASSESSMENT
[FreeTextEntry1] :   Assessment: 73-year-old female with a history of right-sided breast cancer s/p bilateral total mastectomies with SLNB and reconstruction with implants, currently on surveillance. Referred to rehab medicine for LUE pain and weakness.   Problems / Impression: 1. Left upper extremity pain -- presumed cervical radiculopathy in the setting of known degenerative spine. No red flag symptoms, although exam significant for brisk reflexes. Improved s/p medrol dose isacc and Flexeril.   Plan/ Recommendations: - Discussed current symptoms, potential etiologies, and management to date, and anticipated clinical course. - Imaging/ Work-up:      Reviewed MRI C-spine as above; follow up final radiology report.  - Therapy:      Start PT for spine/LUE (program detailed below). Provided script. Referred to [facility] therapy.  - Medications:      s/p Medrol Dosepak.       Prescribed Flexeril and Naproxen by PCP. Okay to use as needed. Discussed MOA and potential side effects.       Encouraged taking with Protonix for GI protection. Discussed rationale and potential side effects.  - Educated on red flag signs/ symptoms for which she would need to seek immediate medical attention including, but not limited to, progressive weakness, new or worsening pain, new or worsening sensory change, balance difficulty, and/or bowel/bladder dysfunction.  - Follow-up: 6 weeks following course of PT.  Will call with results of MRI   The patient expressed verbal understanding and is in agreement with the plan of care. All of the patient's questions and concerns were addressed during today's visit.

## 2023-09-01 NOTE — DATA REVIEWED
[MRI] : MRI [FreeTextEntry1] : As per HPI  Prior data reviewed: - CT left upper extremity 8/20/23 -- No acute fracture or dislocation. Mild AC joint arthritis, partially visualized cervical and thoracic spine with moderate degenerative changes. No GH dislocation.

## 2023-09-01 NOTE — HISTORY OF PRESENT ILLNESS
[FreeTextEntry1] :   HPI: 73-year-old female with a history of right-sided breast cancer s/p bilateral total mastectomies with SLNB and reconstruction with implants in , currently on surveillance. Referred to rehab medicine for LUE pain and weakness.  Pertinent PMHx of: Asthma   --------------------------------------------------- 2023 Clinic Follow Up -- Presents for follow up for left upper extremity pain. Finished Medrol taper Rudy. Also followed up with her PCP Dr. Palomares two days ago who prescribed Naproxen and Flexeril PRN. She states he performed an injection in her arm (pointed to deltoid) but does not recall specifics of type of injection. She has noted significant improvement in arm pain since last visit. She denies new weakness, bowel/bladder issues, or gait dysfunction. MRI C-spine obtained yesterday pending final report.   2023 Initial Evaluation -- Presents today with severe left upper extremity pain. Notes pain started about 1 month ago and worsening. Today rates pain as 10/10 and has difficulty finding a comfortable position. Currently using a sling or elevating her arm above her head for relief. She describes pain as constant, achy, and burning. Pain starts left side of her neck and radiates down to her elbow, occasionally down to fingertips. She has difficulty sleeping at night due to pain. Moving her arm makes the pain worse. She was seen in [place] [facility] for this pain a few weeks ago and reports she was given pain medication at the time. She was seen at [facility] ED on  due to arm pain where they performed CT of her upper extremity which was negative for lesions or fracture.  She denies gait/balance issues, or bowel/bladder incontinence, denies lower back pain.  --------------------------------------------------- Relevant imaging/ work up reviewed: - MRI C spine 23 -- Preliminary read; No suspicious enhancing lesions or cord enhancement. Noted to have degenerative changes with disc buldges at C5/6 and C6/7 with moderate stenosis on left. Final radiology report pending at the time of this assessment. --------------------------------------------------- Functional Performance Status: - KPS: 90 - ECO - ADLs/ iADLs: some difficulty due to pain - Mobility: independent  --------------------------------------------------- Providers: Surg: Noemi Smith; ROB Reza.

## 2023-10-10 ENCOUNTER — APPOINTMENT (OUTPATIENT)
Dept: PHYSICAL MEDICINE AND REHAB | Facility: CLINIC | Age: 73
End: 2023-10-10
Payer: MEDICARE

## 2023-10-10 VITALS
HEIGHT: 63 IN | HEART RATE: 88 BPM | SYSTOLIC BLOOD PRESSURE: 138 MMHG | BODY MASS INDEX: 26.75 KG/M2 | WEIGHT: 151 LBS | DIASTOLIC BLOOD PRESSURE: 97 MMHG | OXYGEN SATURATION: 98 %

## 2023-10-10 PROCEDURE — 99213 OFFICE O/P EST LOW 20 MIN: CPT

## 2023-11-08 ENCOUNTER — APPOINTMENT (OUTPATIENT)
Dept: OTOLARYNGOLOGY | Facility: CLINIC | Age: 73
End: 2023-11-08
Payer: MEDICARE

## 2023-11-08 VITALS
HEART RATE: 67 BPM | BODY MASS INDEX: 26.75 KG/M2 | SYSTOLIC BLOOD PRESSURE: 152 MMHG | TEMPERATURE: 96.9 F | DIASTOLIC BLOOD PRESSURE: 78 MMHG | WEIGHT: 151 LBS | HEIGHT: 63 IN

## 2023-11-08 DIAGNOSIS — H60.8X3 OTHER OTITIS EXTERNA, BILATERAL: ICD-10-CM

## 2023-11-08 PROCEDURE — XXXXX: CPT | Mod: 1L

## 2023-11-08 RX ORDER — METHYLPREDNISOLONE 4 MG/1
4 TABLET ORAL
Qty: 21 | Refills: 0 | Status: COMPLETED | COMMUNITY
Start: 2023-08-24 | End: 2023-08-24

## 2023-11-09 RX ORDER — CEFUROXIME AXETIL 250 MG/1
250 TABLET ORAL
Qty: 14 | Refills: 0 | Status: DISCONTINUED | COMMUNITY
Start: 2023-11-08 | End: 2023-11-09

## 2024-02-08 ENCOUNTER — APPOINTMENT (OUTPATIENT)
Dept: BREAST CENTER | Facility: CLINIC | Age: 74
End: 2024-02-08
Payer: MEDICARE

## 2024-02-08 ENCOUNTER — NON-APPOINTMENT (OUTPATIENT)
Age: 74
End: 2024-02-08

## 2024-02-08 VITALS
HEIGHT: 63 IN | DIASTOLIC BLOOD PRESSURE: 79 MMHG | HEART RATE: 92 BPM | SYSTOLIC BLOOD PRESSURE: 162 MMHG | WEIGHT: 147 LBS | BODY MASS INDEX: 26.05 KG/M2

## 2024-02-08 DIAGNOSIS — Z85.3 ENCOUNTER FOR FOLLOW-UP EXAMINATION AFTER COMPLETED TREATMENT FOR MALIGNANT NEOPLASM: ICD-10-CM

## 2024-02-08 DIAGNOSIS — Z90.13 ACQUIRED ABSENCE OF BILATERAL BREASTS AND NIPPLES: ICD-10-CM

## 2024-02-08 DIAGNOSIS — Z08 ENCOUNTER FOR FOLLOW-UP EXAMINATION AFTER COMPLETED TREATMENT FOR MALIGNANT NEOPLASM: ICD-10-CM

## 2024-02-08 PROCEDURE — 99213 OFFICE O/P EST LOW 20 MIN: CPT

## 2024-02-08 NOTE — HISTORY OF PRESENT ILLNESS
[FreeTextEntry1] : Patient is a 74 yo F who presents today for breast cancer surveillance. She has hx of B/l TM & SLNB 5/2020 (age 70) w/ reconstruction (Brien). 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 (microcal 5cm on preop MG) Denies family history of breast or ovarian cancer. No genetic testing. Patient denies palpable masses or skin changes bilaterally. Of note, she is under the care of Dr. Briggs w/ PN&R for L UE pain.   TNM Staging: R pT1mi, pN0. L pTis, pN0  9/15/17: L MG & US- MG- 0.7cm LIQ stable mass, 0.7cm 9:00 stable mass w/ coarse calcs possibly degenerating FA; US previously seen 0.8cm mass 7:00 8FN w/ new irregularity rec US core bx, stable 0.8cm 7:00 8FN mass (bx vs f/u), 0.6cm 9:00 6FN mass likely stable & c/w fibroadenoma on MG ( bx vs f/u), BIRADS 4 9/25/17: L US core bx of 7:00 8FN mass path- atypical papillary lesion, concordant, hourglass marker 11/16/17: R MG- stable benign calcs, stable UOQ LN, BIRADS 2 12/12/17: L US core bx of second 7:00 8FN mass path- IDP, marker, concordant; 9:00 6FN mass path- IDP, R shaped marker, concordant 11/20/18: B/l MG- dense, stable benign calcs, R- UOQ stable benign LN, L- stable UOQ post sx changes, 0.4cm UOQ 6FN round mass rec add imaging, BIRADS 0 12/4/18: L MG & B/l US- MG- UOQ persistent asymmetric density; US- L- 0.6cm 1:00 2FN benign cyst c/w nodular density on MG, R- benign cyst, BIRADS 2 11/22/19: B/l MG & US- MG- R- 0.6cm LIQ 6FN new linear branching calcs rec R Dx MG, UOQ LN slightly larger than 2016, L- stable UOQ postsx changes; US- R- 0.7cm 7:00 5FN benign hypoechoic mass, 1cm 10:00 10FN intramammary LN c/w LN on MG w/ mild increase in cortical thickening rec targeted R US, L-several cysts, 0.5cm 2:00 6FN dystrophic calc, BIRADS 0 2/25/20: R MG & US- MG- LIQ 6FN persistent cluster of linear branching calcs rec stereo bx; US- 0.8cm 10:00 10FN intramammary LN wnl rec 6 mth f/u, BIRADS 4 3/7/20: R stereo bx of 3:00 calcs - DCIS w/ extensive necrosis, ER/ID neg, jessica/cork marker, concordant, 5cm 5/4/20: B/l TM & B/l SNB- 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 L DCIS (intermediate nuclear grade), ADH, radial scar, negate margins, 0/1LN. right microinvasive IDC 0/1 lymph node, left DCIS 0/1 lymph node. ER- (0%), ID- (0%), HER2+ (2+)

## 2024-02-08 NOTE — PHYSICAL EXAM
[de-identified] : B/l chest wall/axilla/supraclavicular area: No evidence of recurrence\par  IMPLANTS!!

## 2024-03-01 NOTE — ED ADULT NURSE NOTE - NS ED NURSE DISCH DISPOSITION
attending Aleksandra: 77yF h/o HTN, ESRD on HD via LUE AVF, breast CA, GERD, status post hysterectomy, s/p excision of adrenal mass, transferred from OSH for intraparenchymal hemorrhage. Pt presented to OSH after found lying in bed unresponsive, intubated on arrival for airway protection. CT imaging showing large ICH with shift. Pt arrives on cardene and propofol. NSG at bedside upon arrival. As per family at bedside pt not on anticoagulation. Will obtain preop labs, repeat CT imaging as per NSG, admit to NSG for OR tonight
Discharged

## 2024-04-24 ENCOUNTER — APPOINTMENT (OUTPATIENT)
Dept: OTOLARYNGOLOGY | Facility: CLINIC | Age: 74
End: 2024-04-24
Payer: MEDICARE

## 2024-04-24 VITALS
TEMPERATURE: 96.9 F | DIASTOLIC BLOOD PRESSURE: 77 MMHG | SYSTOLIC BLOOD PRESSURE: 140 MMHG | HEART RATE: 76 BPM | HEIGHT: 63 IN | BODY MASS INDEX: 25.34 KG/M2 | WEIGHT: 143 LBS

## 2024-04-24 DIAGNOSIS — J02.9 ACUTE PHARYNGITIS, UNSPECIFIED: ICD-10-CM

## 2024-04-24 DIAGNOSIS — J32.4 CHRONIC PANSINUSITIS: ICD-10-CM

## 2024-04-24 DIAGNOSIS — J30.2 OTHER SEASONAL ALLERGIC RHINITIS: ICD-10-CM

## 2024-04-24 DIAGNOSIS — R04.1 HEMORRHAGE FROM THROAT: ICD-10-CM

## 2024-04-24 DIAGNOSIS — Z87.09 PERSONAL HISTORY OF OTHER DISEASES OF THE RESPIRATORY SYSTEM: ICD-10-CM

## 2024-04-24 DIAGNOSIS — J33.9 NASAL POLYP, UNSPECIFIED: ICD-10-CM

## 2024-04-24 PROCEDURE — 99214 OFFICE O/P EST MOD 30 MIN: CPT | Mod: 25

## 2024-04-24 PROCEDURE — 31231 NASAL ENDOSCOPY DX: CPT

## 2024-04-24 RX ORDER — MOMETASONE 50 UG/1
50 SPRAY, METERED NASAL TWICE DAILY
Qty: 1 | Refills: 5 | Status: COMPLETED | COMMUNITY
Start: 2020-10-31 | End: 2024-04-24

## 2024-04-24 RX ORDER — METHYLPREDNISOLONE 4 MG/1
4 TABLET ORAL
Qty: 1 | Refills: 0 | Status: ACTIVE | COMMUNITY
Start: 2023-11-08 | End: 1900-01-01

## 2024-04-24 RX ORDER — CEFUROXIME AXETIL 250 MG/1
250 TABLET ORAL
Qty: 14 | Refills: 0 | Status: ACTIVE | COMMUNITY
Start: 2024-04-24 | End: 1900-01-01

## 2024-04-24 RX ORDER — AMOXICILLIN AND CLAVULANATE POTASSIUM 875; 125 MG/1; MG/1
875-125 TABLET, COATED ORAL
Qty: 20 | Refills: 0 | Status: COMPLETED | COMMUNITY
Start: 2023-11-09 | End: 2024-04-24

## 2024-04-28 PROBLEM — Z87.09 HISTORY OF ACUTE SINUSITIS: Status: RESOLVED | Noted: 2022-01-05 | Resolved: 2024-04-28

## 2024-04-28 PROBLEM — R04.1 BLOOD IN THROAT: Status: ACTIVE | Noted: 2024-04-28

## 2024-04-28 RX ORDER — TIZANIDINE 2 MG/1
2 TABLET ORAL
Qty: 30 | Refills: 0 | Status: ACTIVE | COMMUNITY
Start: 2023-10-24

## 2024-04-28 RX ORDER — NAPROXEN 375 MG/1
375 TABLET ORAL
Qty: 180 | Refills: 0 | Status: ACTIVE | COMMUNITY
Start: 2023-08-30

## 2024-04-28 RX ORDER — OLOPATADINE HCL 1 MG/ML
0.1 SOLUTION/ DROPS OPHTHALMIC
Qty: 5 | Refills: 0 | Status: ACTIVE | COMMUNITY
Start: 2023-11-20

## 2024-04-28 RX ORDER — MOMETASONE FUROATE 1 MG/G
0.1 CREAM TOPICAL
Qty: 30 | Refills: 0 | Status: ACTIVE | COMMUNITY
Start: 2024-01-17

## 2024-04-28 RX ORDER — CEFDINIR 300 MG/1
300 CAPSULE ORAL
Qty: 20 | Refills: 0 | Status: COMPLETED | COMMUNITY
Start: 2024-02-07

## 2024-04-28 RX ORDER — CHLORHEXIDINE GLUCONATE 4 %
325 (65 FE) LIQUID (ML) TOPICAL
Qty: 90 | Refills: 0 | Status: ACTIVE | COMMUNITY
Start: 2023-12-27

## 2024-04-28 RX ORDER — PREDNISONE 20 MG/1
20 TABLET ORAL
Qty: 8 | Refills: 0 | Status: COMPLETED | COMMUNITY
Start: 2024-04-03

## 2024-04-28 NOTE — PHYSICAL EXAM
[FreeTextEntry1] : Mildly raspy voice [Nasal Endoscopy Performed] : nasal endoscopy was performed, see procedure section for findings [] : septum deviated to the left [Midline] : trachea located in midline position [Laryngoscopy Performed] : laryngoscopy was performed, see procedure section for findings [Normal] : no neck adenopathy

## 2024-04-28 NOTE — ASSESSMENT
[FreeTextEntry1] : Ms. PERKINS is a 73 year-old woman who was evaluated for the following issues today:   1.)  acute pharyngitis and ? allergic reaction after eating sancocho on 4/14/24.  Had self-limited blood from throat. Overall inflammation pharynx, no lesion or bleeding seen. --> Medrol --> cefuroxime x 7 days   2.) Nasal polyps and chronic sinusitis - no babatunde polyps noted.  More edema on right than on left. --> fluticasone nasal spray daily  3.) Allergic rhinitis - stable on cetirizine  Return in 8 months.

## 2024-04-28 NOTE — PROCEDURE
[FreeTextEntry6] : NASAL ENDOSCOPY Indication: chronic sinusitis -Verbal consent was obtained from patient prior to exam.  - Oxymetazoline 0.05% spray applied to nose bilaterally. Nasal endoscopy was performed with flexible scope. Findings:  -- Septum was intact -- Bilateral inferior turbinates mildly edematous -RIGHT:       -- Middle turbinate partially resected; mild edema and mild polypoid change in axilla.       -- Maxillary antrostomy with clear drainage and polypoid lining       -- Mildly polypoid lining in ethmoid cavity       -- Sphenoidotomy is open but narrow       -- Frontal sinus outflow open but mildly edematous       -- No babatunde polyps - LEFT:        -- Middle turbinate partially resection; no polypoid change       -- Maxillary antrostomy patent with no obstruction or drainage       -- Ethmoid cavity patent, with mild edema       -- Sphenoidotomy is open       -- Frontal sinus outflow open but mildly edematous lining       -- No babatunde polyps - Posterior choanae are clear. The patient tolerated the procedure well.    [de-identified] : LARYNGOSCOPY EXAM:  Indication:  blood in throat -Verbal consent was obtained from patient prior to procedure. -Oxymetazoline 0.05% spray applied to the nasal cavities. Flexible laryngoscopy was performed via left nostril and revealed the following:   -- Nasopharynx had no mass or exudate.   -- Base of tongue was symmetric and not enlarged.   -- Vallecula was clear   -- Epiglottis, both aryepiglottic folds and both false vocal folds were normal   -- Arytenoids both with moderate edema and mild erythema    -- True vocal folds were fully mobile and without lesions.    -- Post cricoid area was clear.   -- Interarytenoid edema was present.   -- Overall mild edema laryngopharyngeal mucosa.   -- No lesions seen in laryngopharynx The patient tolerated the procedure well.

## 2024-04-28 NOTE — HISTORY OF PRESENT ILLNESS
[de-identified] : Ms. 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  Immediately after she ate the sancocho, she noticed redness along her anterior neck, for which she took montelukast and a topical cream.  Later that day, she developed severe throat pain and blood from her throat.  Bleed resolved by end of the day.  Now, throat ia still uncomfortable and she has feeling of phlegm like she has a cold.  Able to swallow.  No difficulty breathing or voice change. Breathing through nose well. Wears mask outside to avoid pollen inhalation.  Smelled perfume recently.  She stopped using nasal steroid spray 3 months ago because had no nasal sx after treated for acute sinusitis in Nov 2023. Bought a house in [place], and she may move there permanently.  VISIT 11/10/2023  Admitted to [facility] for 5 days for asthma exacerbation in August 2023, after exposed to construction dust from apartment next door. She felt great for a while after d/c. Has been taking montelukast. Has nebulizer machine at home now but now working for her. Feels lot of phlegm in throat and postnasal drip x past 3 weeks - phlegm is clear but burns. Able to breathe through nose but sleeping with mouth open lately. Smell is diminished. Taking mometasone spray. Doesnt have any more herbal medication from .  VISIT 4/28/2023 Ms. JOSEPH reports major improvement in runny nose, cough and phlegm, after drinking an herbal remedy from [place]. She can smell again. She used Xhance for 1 month, then stopped because the copay for refill was too high. Now she uses mometasone as needed. On asthma inhalers as needed Heartburn is improved on PPI.  VISIT 1/11/2023 Ms. JOSEPH reports that cough and phlegm returned a little over the past month. No facial pain, nasal congestion or headache. Longstanding anosmia. No asthma exacerbation. Sinusitis exacerbation treated with amox-clav and Medrol in Oct 2022. Still did not get new Xhance inhaler. Infrequent heartburn on PPI.  VISIT 10/06/2023 Ms. JOSEPH presents for cough and phlegm. She is coughing day and night often for the past 2 months; cough is waking her from sleep. She feels she has a cold but cannot feel postnasal drip. Breathing through the nose is okay and she has no headaches. Asthma is a little better. Taking pantoprazole daily, she feels acid coming up sometimes. S/p endoscopic removal of recurrent left ethmoid polyp in office on December 19, 2017. S/p revision bilateral endoscopic sinus surgery (frontal, maxillary, total ethmoid, sphenoid) and partial middle turbinate resection bilateral on September 11, 2017.  VISIT 4/13/2022 Today she feels like she "has a cold" in her throat. She is spitting out clear mucus. She has runny nose. Asthma is controlled, no trouble breathing. She is doing budesonide rinses for sinuses On mometasone spray and Singulair. Xhance is not yet approved by her insurance company. She has spring tree pollen allergies. Dry eyes are much improved. She is on pantoprazole for reflux and has no heartburn. She is still having ear itching bilateral, no pain or d/c. She did not buy mineral oil.  VISIT 3/25/2022 Today, she reports that cough resolved after treatment but then returned about 2 weeks ago. Cough is not as bad as before and is coming from the throat; mucus clear with occasional yellow color. Has postnasal drip; no runny nose or congestion. She is able to smell and taste. Asthma is controlled, no trouble breathing. She is doing budesonide rinses. She was prescribed Xhance inhaler previously, but she today said she didn't receive it. On mometasone spray and Singulair. She has spring tree pollen allergies. Dry eyes are much improved.. She is on pantoprazole for reflux and has no heartburn. She is still having ear itching bilateral, no pain or d/c. She could not find mineral oil.  VISIT 01/05/2022 Ms. JOSEPH reports persistent cough since November 2021. She still has clear phlegm from her throat. She is coughing all the time and is embarrassed that people think she is sick. Lot of postnasal drip Last treated for acute sinusitis in Aug 2021, with growth of Pseudomonas aeruginosa from left maxillary sinus. 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 She is doing budesonide rinses. She received Xhance and is using it once at night. Asthma is controlled, no trouble breathing. Her eyes have been dry after blepharoplasty in May 2021. She is on pantoprazole for reflux and no symptoms. She is still having ear itching bilateral, no pain or d/c.  VISIT 10/01/2021 Ms. JOSEPH reports that cough is gone, phlegm improved with antibiotics. Nasal breathing is okay. Didnt use the budesonide nasal rinse since she couldn't find sterile water. Salivary gland swelling is improving with triple antibiotic ointment that she's using. Asthma is controlled on medication. She is on pantoprazole for reflux and no symptoms. She is still having ear itching but not too much. Her eyes have been dry after blepharoplasty in May 2021. S/p endoscopic removal of recurrent left ethmoid polyp in office on December 19, 2017. S/P revision bilateral endoscopic sinus surgery (frontal, maxillary, total ethmoid, sphenoid) and partial middle turbinate resection bilateral on September 11, 2017.  VISIT 6/25/2021 Ms. Joseph still has phlegm and cough, due to asthma. Able to breathe through nose. No postnasal drip, rhinorrhea. Treated in Jan 2021 with antibiotics and oral steroids for 6 months of persistent productive cough due to sinusitis exacerbation --> relief. Treated in 3/2021 with cephalexin and Medrol Dosepak for return cough/phlegm. On mometasone spray and Singulair. Went to  on in April x 2 months - couldn't stay by the pool because had trouble breathing, thought to be from cleaning chemicals. She had to take albuterol 4 times in a row. S/p face/neck/eyebrow lift in  1 month ago. S/p endoscopic removal of recurrent left ethmoid polyp in office on December 19, 2017. S/P revision bilateral endoscopic sinus surgery (frontal, maxillary, total ethmoid, sphenoid) and partial middle turbinate resection bilateral on September 11, 2017. On pantoprazole for reflux and has no heartburn Hx of vocal cord polyps and chronic hoarseness.

## 2024-04-28 NOTE — CONSULT LETTER
[Dear  ___] : Dear  [unfilled], [Please see my note below.] : Please see my note below. [Courtesy Letter:] : I had the pleasure of seeing your patient, [unfilled], in my office today. [Consult Closing:] : Thank you very much for allowing me to participate in the care of this patient.  If you have any questions, please do not hesitate to contact me. [Sincerely,] : Sincerely, [FreeTextEntry2] : Hai Palomares MD [address] [place] [id number]   [FreeTextEntry3] :  Hetal Teresa MD  Otolaryngology, Head and Neck Surgery

## 2024-05-17 ENCOUNTER — RX CHANGE (OUTPATIENT)
Age: 74
End: 2024-05-17

## 2024-05-17 RX ORDER — FLUTICASONE PROPIONATE 50 UG/1
50 SPRAY, METERED NASAL DAILY
Qty: 1 | Refills: 5 | Status: DISCONTINUED | COMMUNITY
Start: 2024-04-24 | End: 2024-05-17

## 2024-05-31 LAB — BACTERIA THROAT CULT: NORMAL

## 2024-06-01 ENCOUNTER — INPATIENT (INPATIENT)
Facility: HOSPITAL | Age: 74
LOS: 3 days | Discharge: HOME | End: 2024-06-05
Attending: HOSPITALIST | Admitting: STUDENT IN AN ORGANIZED HEALTH CARE EDUCATION/TRAINING PROGRAM
Payer: MEDICARE

## 2024-06-01 VITALS
SYSTOLIC BLOOD PRESSURE: 162 MMHG | HEART RATE: 84 BPM | DIASTOLIC BLOOD PRESSURE: 84 MMHG | TEMPERATURE: 98 F | HEIGHT: 63 IN | RESPIRATION RATE: 19 BRPM | WEIGHT: 147.93 LBS | OXYGEN SATURATION: 96 %

## 2024-06-01 DIAGNOSIS — Z87.09 PERSONAL HISTORY OF OTHER DISEASES OF THE RESPIRATORY SYSTEM: ICD-10-CM

## 2024-06-01 DIAGNOSIS — Z90.710 ACQUIRED ABSENCE OF BOTH CERVIX AND UTERUS: Chronic | ICD-10-CM

## 2024-06-01 DIAGNOSIS — Z29.9 ENCOUNTER FOR PROPHYLACTIC MEASURES, UNSPECIFIED: ICD-10-CM

## 2024-06-01 DIAGNOSIS — Z98.89 OTHER SPECIFIED POSTPROCEDURAL STATES: Chronic | ICD-10-CM

## 2024-06-01 DIAGNOSIS — E78.5 HYPERLIPIDEMIA, UNSPECIFIED: ICD-10-CM

## 2024-06-01 DIAGNOSIS — J45.901 UNSPECIFIED ASTHMA WITH (ACUTE) EXACERBATION: ICD-10-CM

## 2024-06-01 DIAGNOSIS — K21.9 GASTRO-ESOPHAGEAL REFLUX DISEASE WITHOUT ESOPHAGITIS: ICD-10-CM

## 2024-06-01 LAB
ALBUMIN SERPL ELPH-MCNC: 4 G/DL — SIGNIFICANT CHANGE UP (ref 3.3–5)
ALP SERPL-CCNC: 67 U/L — SIGNIFICANT CHANGE UP (ref 40–120)
ALT FLD-CCNC: 11 U/L — SIGNIFICANT CHANGE UP (ref 10–45)
ANION GAP SERPL CALC-SCNC: 11 MMOL/L — SIGNIFICANT CHANGE UP (ref 5–17)
ANISOCYTOSIS BLD QL: SLIGHT — SIGNIFICANT CHANGE UP
AST SERPL-CCNC: 21 U/L — SIGNIFICANT CHANGE UP (ref 10–40)
BASOPHILS # BLD AUTO: 0.07 K/UL — SIGNIFICANT CHANGE UP (ref 0–0.2)
BASOPHILS NFR BLD AUTO: 1.7 % — SIGNIFICANT CHANGE UP (ref 0–2)
BILIRUB SERPL-MCNC: 0.5 MG/DL — SIGNIFICANT CHANGE UP (ref 0.2–1.2)
BUN SERPL-MCNC: 12 MG/DL — SIGNIFICANT CHANGE UP (ref 7–23)
CALCIUM SERPL-MCNC: 9.8 MG/DL — SIGNIFICANT CHANGE UP (ref 8.4–10.5)
CHLORIDE SERPL-SCNC: 105 MMOL/L — SIGNIFICANT CHANGE UP (ref 96–108)
CO2 SERPL-SCNC: 28 MMOL/L — SIGNIFICANT CHANGE UP (ref 22–31)
CREAT SERPL-MCNC: 0.73 MG/DL — SIGNIFICANT CHANGE UP (ref 0.5–1.3)
EGFR: 86 ML/MIN/1.73M2 — SIGNIFICANT CHANGE UP
EOSINOPHIL # BLD AUTO: 1.17 K/UL — HIGH (ref 0–0.5)
EOSINOPHIL NFR BLD AUTO: 27 % — HIGH (ref 0–6)
FLUAV AG NPH QL: SIGNIFICANT CHANGE UP
FLUBV AG NPH QL: SIGNIFICANT CHANGE UP
GIANT PLATELETS BLD QL SMEAR: PRESENT — SIGNIFICANT CHANGE UP
GLUCOSE SERPL-MCNC: 94 MG/DL — SIGNIFICANT CHANGE UP (ref 70–99)
HCT VFR BLD CALC: 44.7 % — SIGNIFICANT CHANGE UP (ref 34.5–45)
HGB BLD-MCNC: 14.4 G/DL — SIGNIFICANT CHANGE UP (ref 11.5–15.5)
LYMPHOCYTES # BLD AUTO: 1.36 K/UL — SIGNIFICANT CHANGE UP (ref 1–3.3)
LYMPHOCYTES # BLD AUTO: 31.3 % — SIGNIFICANT CHANGE UP (ref 13–44)
MANUAL SMEAR VERIFICATION: SIGNIFICANT CHANGE UP
MCHC RBC-ENTMCNC: 28.6 PG — SIGNIFICANT CHANGE UP (ref 27–34)
MCHC RBC-ENTMCNC: 32.2 GM/DL — SIGNIFICANT CHANGE UP (ref 32–36)
MCV RBC AUTO: 88.7 FL — SIGNIFICANT CHANGE UP (ref 80–100)
MICROCYTES BLD QL: SLIGHT — SIGNIFICANT CHANGE UP
MONOCYTES # BLD AUTO: 0.56 K/UL — SIGNIFICANT CHANGE UP (ref 0–0.9)
MONOCYTES NFR BLD AUTO: 13 % — SIGNIFICANT CHANGE UP (ref 2–14)
NEUTROPHILS # BLD AUTO: 1.13 K/UL — LOW (ref 1.8–7.4)
NEUTROPHILS NFR BLD AUTO: 26.1 % — LOW (ref 43–77)
NT-PROBNP SERPL-SCNC: 88 PG/ML — SIGNIFICANT CHANGE UP (ref 0–300)
OVALOCYTES BLD QL SMEAR: SLIGHT — SIGNIFICANT CHANGE UP
PLAT MORPH BLD: ABNORMAL
PLATELET # BLD AUTO: 268 K/UL — SIGNIFICANT CHANGE UP (ref 150–400)
POIKILOCYTOSIS BLD QL AUTO: SLIGHT — SIGNIFICANT CHANGE UP
POLYCHROMASIA BLD QL SMEAR: SLIGHT — SIGNIFICANT CHANGE UP
POTASSIUM SERPL-MCNC: 4.5 MMOL/L — SIGNIFICANT CHANGE UP (ref 3.5–5.3)
POTASSIUM SERPL-SCNC: 4.5 MMOL/L — SIGNIFICANT CHANGE UP (ref 3.5–5.3)
PROT SERPL-MCNC: 6.7 G/DL — SIGNIFICANT CHANGE UP (ref 6–8.3)
RAPID RVP RESULT: SIGNIFICANT CHANGE UP
RBC # BLD: 5.04 M/UL — SIGNIFICANT CHANGE UP (ref 3.8–5.2)
RBC # FLD: 15.3 % — HIGH (ref 10.3–14.5)
RBC BLD AUTO: ABNORMAL
RSV RNA NPH QL NAA+NON-PROBE: SIGNIFICANT CHANGE UP
SARS-COV-2 RNA SPEC QL NAA+PROBE: SIGNIFICANT CHANGE UP
SARS-COV-2 RNA SPEC QL NAA+PROBE: SIGNIFICANT CHANGE UP
SMUDGE CELLS # BLD: PRESENT — SIGNIFICANT CHANGE UP
SODIUM SERPL-SCNC: 144 MMOL/L — SIGNIFICANT CHANGE UP (ref 135–145)
VARIANT LYMPHS # BLD: 0.9 % — SIGNIFICANT CHANGE UP (ref 0–6)
WBC # BLD: 4.33 K/UL — SIGNIFICANT CHANGE UP (ref 3.8–10.5)
WBC # FLD AUTO: 4.33 K/UL — SIGNIFICANT CHANGE UP (ref 3.8–10.5)

## 2024-06-01 PROCEDURE — 99285 EMERGENCY DEPT VISIT HI MDM: CPT

## 2024-06-01 PROCEDURE — 71045 X-RAY EXAM CHEST 1 VIEW: CPT | Mod: 26

## 2024-06-01 PROCEDURE — 99223 1ST HOSP IP/OBS HIGH 75: CPT

## 2024-06-01 PROCEDURE — 93010 ELECTROCARDIOGRAM REPORT: CPT

## 2024-06-01 RX ORDER — PANTOPRAZOLE SODIUM 20 MG/1
40 TABLET, DELAYED RELEASE ORAL
Refills: 0 | Status: DISCONTINUED | OUTPATIENT
Start: 2024-06-01 | End: 2024-06-05

## 2024-06-01 RX ORDER — MOMETASONE FUROATE 50 UG/1
2 SPRAY NASAL
Refills: 0 | DISCHARGE

## 2024-06-01 RX ORDER — ENOXAPARIN SODIUM 100 MG/ML
40 INJECTION SUBCUTANEOUS EVERY 24 HOURS
Refills: 0 | Status: DISCONTINUED | OUTPATIENT
Start: 2024-06-01 | End: 2024-06-05

## 2024-06-01 RX ORDER — MONTELUKAST 4 MG/1
1 TABLET, CHEWABLE ORAL
Refills: 0 | DISCHARGE

## 2024-06-01 RX ORDER — PANTOPRAZOLE SODIUM 20 MG/1
1 TABLET, DELAYED RELEASE ORAL
Refills: 0 | DISCHARGE

## 2024-06-01 RX ORDER — IPRATROPIUM/ALBUTEROL SULFATE 18-103MCG
3 AEROSOL WITH ADAPTER (GRAM) INHALATION ONCE
Refills: 0 | Status: COMPLETED | OUTPATIENT
Start: 2024-06-01 | End: 2024-06-01

## 2024-06-01 RX ORDER — FLUTICASONE PROPIONATE AND SALMETEROL 50; 250 UG/1; UG/1
1 POWDER ORAL; RESPIRATORY (INHALATION)
Refills: 0 | DISCHARGE

## 2024-06-01 RX ORDER — FLUTICASONE PROPIONATE 50 MCG
2 SPRAY, SUSPENSION NASAL EVERY 12 HOURS
Refills: 0 | Status: DISCONTINUED | OUTPATIENT
Start: 2024-06-01 | End: 2024-06-05

## 2024-06-01 RX ORDER — ATORVASTATIN CALCIUM 80 MG/1
40 TABLET, FILM COATED ORAL AT BEDTIME
Refills: 0 | Status: DISCONTINUED | OUTPATIENT
Start: 2024-06-01 | End: 2024-06-05

## 2024-06-01 RX ORDER — SODIUM CHLORIDE 9 MG/ML
500 INJECTION INTRAMUSCULAR; INTRAVENOUS; SUBCUTANEOUS ONCE
Refills: 0 | Status: COMPLETED | OUTPATIENT
Start: 2024-06-01 | End: 2024-06-01

## 2024-06-01 RX ORDER — IPRATROPIUM/ALBUTEROL SULFATE 18-103MCG
3 AEROSOL WITH ADAPTER (GRAM) INHALATION EVERY 6 HOURS
Refills: 0 | Status: DISCONTINUED | OUTPATIENT
Start: 2024-06-01 | End: 2024-06-02

## 2024-06-01 RX ORDER — MONTELUKAST 4 MG/1
10 TABLET, CHEWABLE ORAL DAILY
Refills: 0 | Status: DISCONTINUED | OUTPATIENT
Start: 2024-06-01 | End: 2024-06-05

## 2024-06-01 RX ORDER — MAGNESIUM SULFATE 500 MG/ML
2 VIAL (ML) INJECTION ONCE
Refills: 0 | Status: COMPLETED | OUTPATIENT
Start: 2024-06-01 | End: 2024-06-01

## 2024-06-01 RX ORDER — ALBUTEROL 90 UG/1
2 AEROSOL, METERED ORAL
Refills: 0 | DISCHARGE

## 2024-06-01 RX ADMIN — Medication 125 MILLIGRAM(S): at 12:28

## 2024-06-01 RX ADMIN — Medication 3 MILLILITER(S): at 23:04

## 2024-06-01 RX ADMIN — Medication 3 MILLILITER(S): at 12:20

## 2024-06-01 RX ADMIN — ENOXAPARIN SODIUM 40 MILLIGRAM(S): 100 INJECTION SUBCUTANEOUS at 23:05

## 2024-06-01 RX ADMIN — SODIUM CHLORIDE 500 MILLILITER(S): 9 INJECTION INTRAMUSCULAR; INTRAVENOUS; SUBCUTANEOUS at 12:28

## 2024-06-01 RX ADMIN — Medication 150 GRAM(S): at 16:39

## 2024-06-01 RX ADMIN — MONTELUKAST 10 MILLIGRAM(S): 4 TABLET, CHEWABLE ORAL at 23:04

## 2024-06-01 RX ADMIN — Medication 3 MILLILITER(S): at 12:28

## 2024-06-01 RX ADMIN — ATORVASTATIN CALCIUM 40 MILLIGRAM(S): 80 TABLET, FILM COATED ORAL at 23:04

## 2024-06-01 RX ADMIN — Medication 3 MILLILITER(S): at 14:10

## 2024-06-01 NOTE — H&P ADULT - PROBLEM SELECTOR PLAN 3
home med: atorvastatin 40mg QD Patient with hx of HLD. Home med: atorvastatin 40mg QD    - c/w home med
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
## 2024-06-01 NOTE — H&P ADULT - HISTORY OF PRESENT ILLNESS
***INCOMPLETE**  72 y/o F with PMHx of asthma, chronic sinusitis and nasal polyps (s/p endoscopic sinus surgery and polypectomy in 2016), GERD, and HLD presenting for wheezing with minimal relief non home inhalers    ED Course:  Vitals: T: 98.1, HR: 84, BP: 162/84, RR: 19, SpO2: 96% on RA  Labs: WBC: 4.33, Eosinophils: 1.17, Eosinophil %: 27%. Negtive for COVID, Influenza A/B, RSV   Imaging: CXR   Interventions: magnesium sulfate 2g x1, Solumedrol 125mg IVP x1, 500cc NS bolus x1, Duoneb x3    72 y/o F with PMHx of asthma, chronic sinusitis and nasal polyps (s/p endoscopic sinus surgery and polypectomy in 2016), GERD, and HLD presenting for wheezing, productive cough, and shortness of breath for ~1 week. Patient states she has a hx of asthma exacerbations (not requiring intubation), which are usually triggered by dust. She reports an asthma exacerbation ~8 months ago for which she was hospitalized at [facility], she beleives the exacerbation was triggered by construction and subsequent dust in her building. This time around, patient states there was construction going on outside of her building. She denies sick contacts, recent travel, sore throat, runny nose, chest pain, n/v/d.     ED Course:  Vitals: T: 98.1, HR: 84, BP: 162/84, RR: 19, SpO2: 96% on RA  Labs: WBC: 4.33, Eosinophils: 1.17, Eosinophil %: 27%. Negtive for COVID, Influenza A/B, RSV   Imaging: CXR   Interventions: magnesium sulfate 2g x1, Solumedrol 125mg IVP x1, 500cc NS bolus x1, Duoneb x3    74 y/o F with PMHx of asthma, chronic sinusitis and nasal polyps (s/p endoscopic sinus surgery and polypectomy in 2016), GERD, and HLD presenting for wheezing, productive cough, and shortness of breath for ~1 week. Patient states she has a hx of asthma exacerbations (not requiring intubation), which are usually triggered by dust. She reports an asthma exacerbation ~8 months ago for which she was hospitalized at [facility], she believes the exacerbation was triggered by construction and subsequent dust in her building. This time around, patient states there was construction going on outside of her building. She denies sick contacts, recent travel, sore throat, runny nose, chest pain, n/v/d.   Home inhalers: Ventolin 2 puffs q6h PRN and Advair 500/50 1puff BID. Patient reports compliance with home inhalers, has been using Ventolin twice daily.      ED Course:  Vitals: T: 98.1, HR: 84, BP: 162/84, RR: 19, SpO2: 96% on RA  Labs: WBC: 4.33, Eosinophils: 1.17, Eosinophil %: 27%. Negtive for COVID, Influenza A/B, RSV   Imaging: CXR   Interventions: magnesium sulfate 2g x1, Solumedrol 125mg IVP x1, 500cc NS bolus x1, Duoneb x3

## 2024-06-01 NOTE — ED PROVIDER NOTE - PHYSICAL EXAMINATION
GEN: Well appearing, well developed, awake, alert, oriented to person, place, time/situation and in no apparent distress. NTAF  ENT: Airway patent, Nasal mucosa clear. Mouth with normal mucosa.  EYES: Clear bilaterally. PERRL, EOMI  RESPIRATORY: Diffuse bilateral expiratory wheezing, no hypoxia, no tachypnea, no crackles.  CARDIAC: Regular rate and rhythm, no M/R/G  ABDOMEN: Soft, nontender, +bowel sounds, no rebound, rigidity, or guarding.  MSK: Range of motion is not limited, no deformities noted.  NEURO: Alert and oriented, no focal deficits.  SKIN: Skin normal color for race, warm, dry and intact. No evidence of rash.  PSYCH: Alert and oriented to person, place, time/situation. normal mood and affect. no apparent risk to self or others.

## 2024-06-01 NOTE — H&P ADULT - PROBLEM SELECTOR PLAN 1
Patient presenting with wheezing   CXR clear     - start prednisone 40mg x4 days (got 1 dose of solumedrol in ED)  - duoneb q6h  - f/u full RVP Patient with hx of asthma, presenting with wheezing, productive cough, and shortness of breath for ~1 week. She reports experiencing asthma exacerbations in the past (no intubations). Most recently, 8 months ago at [facility].   Home meds: Ventolin 2 puffs q6h PRN. Advair 500/50 1 puff BID, Montelukast 10mg QD  Patient believes the construction and dust outside of her building exacerbated her asthma. She denies sick contacts, sore throat runny nose but endorses a productive cough.   On admission- CXR clear, negative for COVID-19, RSV, and Influenza A/B. No elevated WBC. Low concern for infectious process at this time     - start prednisone 40mg x4 days (got 1 dose of solumedrol in ED)  - duoneb q6h  - f/u full RVP Patient with hx of asthma, presenting with wheezing, productive cough, and shortness of breath for ~1 week. She reports experiencing asthma exacerbations in the past (no intubations). Most recently, 8 months ago at [facility].   Home meds: Ventolin 2 puffs q6h PRN. Advair 500/50 1 puff BID, Montelukast 10mg QD  Patient believes the construction and dust outside of her building exacerbated her asthma. She denies sick contacts, sore throat runny nose but endorses a productive cough.   On admission- CXR clear, negative for COVID-19, RSV, and Influenza A/B. No elevated WBC. No iWOB, satting well on RA. Low concern for infectious process at this time     - start prednisone 40mg x4 days (got 1 dose of solumedrol in ED)  - duoneb q6h  - f/u full RVP

## 2024-06-01 NOTE — H&P ADULT - ATTENDING COMMENTS
72 yo F with PMHx asthma, chronic sinusitis c/b nasal polyps (s/p polypectomy, 2016), GERD, HLD px from home with 1-week hx of shortness of breath with wheezing, admitted for further evaluation and management of asthma exacerbation 2/2 environmental trigger.     Not meeting SIRS/sepsis criteria. VSS. Satting well on RA at rest. CBC with peripheral eosinophilia 27%. Remainder of labs within normal limits. COVID/Flu/RSV PCR negative. CXR without consolidations or effusions. Asthma exacerbation likely 2/2 environmental trigger as pt reports frequent exacerbations i/s/o construction activity at her home.     [ ] Prednisone to complete x5d steroids   [ ] Duonebs Q6 standing   [ ] Peak flow measurement

## 2024-06-01 NOTE — H&P ADULT - NSHPPHYSICALEXAM_GEN_ALL_CORE
Physical Examination:  General: Alert and oriented x 3. No acute distress. Well-nourished.  Eyes: EOMI. Anicteric.  HEENT: Moist mucous membranes. No scleral icterus. No cervical lymphadenopathy.  Lungs: Clear to auscultation bilaterally. No accessory muscle use.  Cardiovascular: Regular rate and rhythm. No murmur. No JVD.  Abdomen: Soft, non-tender and non-distended. No palpable masses.  Extremities: No edema. Non-tender.  Skin: No rashes or lesions. Warm.  Neurologic: No focal neurological deficits. CN II-XII grossly intact, but not individually tested.  Psychiatric: Cooperative. Appropriate mood and affect. Physical Examination:  General: Alert and oriented x 3. No acute distress.   HEENT: Moist mucous membranes. No cervical lymphadenopathy.  Lungs: +b/l wheezing in all lung fields. no iWOB. no accessory muscle use.    Cardiovascular: Regular rate and rhythm. No M/R/G.   Abdomen: Soft, non-tender and non-distended. No palpable masses.  Extremities: No edema. Non-tender.  Neurologic: No focal neurological deficits.  Psychiatric: Cooperative. Appropriate mood and affect.

## 2024-06-01 NOTE — PATIENT PROFILE ADULT - NSPROPTRIGHTSUPPORTPERSON_GEN_A_NUR
Melody Medina, a female of 59 y.o.did a virtual visit on 1/5/2022. Patient Active Problem List   Diagnosis    OA (osteoarthritis)    Hypothyroidism    JOSE on CPAP    HTN (hypertension)    PETRA [facility]acute kidney injury) ([facility]    PFO with atrial septal aneurysm    Adult BMI >=70 kg/sq m ([facility]    Rt Hemiparesis following cerebrovascular accident (CVA) ([facility]    Neuropathy    Depression    Venous insufficiency of both lower extremities    FLAVIO (generalized anxiety disorder)    Varicose veins of left leg with edema    Cellulitis of right leg    Iron deficiency anemia    Postmenopausal vaginal bleeding    Complex endometrial hyperplasia with atypia    Lymphedema of both lower extremities    Pneumonia due to COVID-19 virus    Hemiparesis affecting right side as late effect of cerebrovascular accident (CVA) ([facility]    CKD (chronic kidney disease) stage 3, GFR 30-59 ml/min ([facility]    Morbid obesity with BMI of 60.0-69.9, adult ([facility]    Hypoprothrombinemia (HCC)          HPI L wrist pain: x ray done showing mild arthritic changes but she's having a lot of pain despite using Voltaren cr qid. Wearing brace without relief. Not doing any exercises. Pain for 1 month. No injury. Allergies   Allergen Reactions    Pcn [Penicillins] Shortness Of Breath    Penicillin G Shortness Of Breath       Current Outpatient Medications on File Prior to Visit   Medication Sig Dispense Refill    citalopram (CELEXA) 40 MG tablet Take 1 tablet by mouth daily 90 tablet 0    triamcinolone (KENALOG) 0.1 % cream Apply topically 2 times daily.  80 g 1    diclofenac sodium (VOLTAREN) 1 % GEL Apply 4 g topically 4 times daily 100 g 1    vitamin D (ERGOCALCIFEROL) 1.25 MG (81513 UT) CAPS capsule Take 1 capsule by mouth once a week 12 capsule 0    baclofen (LIORESAL) 10 MG tablet TAKE 1/2 TABLET BY MOUTH 2 TIMES A DAY 90 tablet 0    warfarin (COUMADIN) 5 MG tablet Take 1 tablet by mouth daily TAKE ONE TABLET BY MOUTH DAILY ON SUNDAYS, [address], [address] [facility] AND FRIDAYS. 90 tablet 1    nystatin (MYCOSTATIN) 460222 UNIT/GM powder Apply 3 times daily. 60 g 1    Corn Starch (JOHNSONS BABY CORNSTARCH) POWD Apply in a.m. 392 g Idania Thomas by Does not apply route Semiautomatic 1 each 0    atorvastatin (LIPITOR) 20 MG tablet Take 1 tablet by mouth daily 90 tablet 1    busPIRone (BUSPAR) 15 MG tablet Take 15 mg by mouth 2 times daily 180 tablet 1    gabapentin (NEURONTIN) 600 MG tablet Take 1 tablet by mouth 2 times daily for 90 days. 180 tablet 0    levothyroxine (SYNTHROID) 150 MCG tablet Take 1 tablet by mouth daily 90 tablet 1    loratadine (CLARITIN) 10 MG capsule Take 1 capsule by mouth daily 90 capsule 1    benzonatate (TESSALON PERLES) 100 MG capsule Take 1 capsule by mouth 3 times daily as needed for Cough 30 capsule 0    traMADol (ULTRAM) 50 MG tablet Take 50 mg by mouth every 6 hours as needed for Pain.  mupirocin (BACTROBAN) 2 % ointment Apply topically 3 times daily. 22 g 2    mineral oil-hydrophilic petrolatum (HYDROPHOR) ointment Apply topically as needed. 228 g 0    Disposable Gloves (LATEX GLOVES MEDIUM) MISC Use as needed. 100 each 0    Elastic Bandages & Supports (MEDICAL COMPRESSION STOCKINGS) MISC 1 each by Does not apply route daily 1 each 0    Magnesium Cl-Calcium Carbonate (SLOW-MAG PO) Take by mouth nightly      Lift Chair MISC by Does not apply route 1 each 0    acetaminophen (TYLENOL ARTHRITIS PAIN) 650 MG extended release tablet Take 1,300 mg by mouth every 8 hours as needed for Pain      CPAP Machine MISC by Does not apply route      levonorgestrel (MIRENA, 52 MG,) IUD 52 mg 1 each by Intrauterine route once      Misc. Devices (GEL-FOAM CUSHION) MISC For chair 1 each 0     No current facility-administered medications on file prior to visit.        Review of Systems   Musculoskeletal:        See hpi     other review of systems reviewed and are negative    OBJECTIVE:  There were no vitals taken for this visit. Physical Exam  Constitutional:       General: She is not in acute distress. Appearance: Normal appearance. She is not ill-appearing, toxic-appearing or diaphoretic. HENT:      Head: Normocephalic. Eyes:      General: No scleral icterus. Pulmonary:      Effort: Pulmonary effort is normal.   Neurological:      Mental Status: She is alert and oriented to person, place, and time. Psychiatric:         Mood and Affect: Mood normal.     L wrist: no swelling noted. Dec ROM flexion and extension. ASSESSMENT AND PLAN:    Gabbi Doll was seen today for wrist pain. Diagnoses and all orders for this visit:    Left wrist tendonitis  -     methylPREDNISolone (MEDROL DOSEPACK) 4 MG tablet; Take by mouth, as directed. - get exercises on internet for wrist and do daily  - if no relief will need ortho referral.   - continue Voltaren gel.   - heat to area. - recommend covid vaccine    Return if symptoms worsen or fail to improve in 2 wks. Sarah German, DO    TeleMedicine Patient Consent    This visit was performed as a virtual video visit using a synchronous, two-way, audio-video telehealth technology platform. Patient identification was verified at the start of the visit, including the patient's telephone number and physical location. I discussed with the patient the nature of our telehealth visits, that:     1. Due to the nature of an audio- video modality, the only components of a physical exam that could be done are the elements supported by direct observation. 2. I would evaluate the patient and recommend diagnostics and treatments based on my assessment. 3. If it was felt that the patient should be evaluated in clinic or an emergency room setting, then they would be directed there. 4. Our sessions are not being recorded and that personal health information is protected. 5. Our team would provide follow up care in person if/when the patient needs it.        Patient does agree to proceed with telemedicine consultation. Patient's location: home address in [facility] [place]. Physician  location [facility]. other people involved in call none. Time spent: Not billed by time    This visit was completed virtually using Doxy. me same name as above

## 2024-06-01 NOTE — PATIENT PROFILE ADULT - FALL HARM RISK - RISK INTERVENTIONS

## 2024-06-01 NOTE — H&P ADULT - PROBLEM SELECTOR PLAN 2
chronic sinusitis and nasal polyps (s/p endoscopic sinus surgery and polypectomy in 2016) Patient with hx of chronic sinusitis and nasal polyps (s/p endoscopic sinus surgery and polypectomy in 2016).   Home meds: mometasone ate 50mcg spray 2 spays daily     - c/w home meds Patient with hx of chronic sinusitis and nasal polyps (s/p endoscopic sinus surgery and polypectomy in 2016).   Home meds: mometasone ate 50mcg spray 2 spays daily     - c/w home med

## 2024-06-01 NOTE — PATIENT PROFILE ADULT - ...
01-Jun-2024 18:11:46 Solaraze Counseling:  I discussed with the patient the risks of Solaraze including but not limited to erythema, scaling, itching, weeping, crusting, and pain.

## 2024-06-01 NOTE — ED PROVIDER NOTE - OBJECTIVE STATEMENT
73F with a h/o asthma, gerd, chronic sinusitis who p/w SOb and wheezes x 3 days associated with dry cough. pt states she has been using her inhalers at home with minimal relief. No f/c, no cp, no dizziness or syncope.

## 2024-06-01 NOTE — H&P ADULT - PROBLEM SELECTOR PLAN 5
F: tolerating PO, no IVF  E: replete K<4, Mg<2  N: DASH   VTE Prophylaxis: Lovenox 40mg q24h  GI: PPI  C: Full Code  D: RMF

## 2024-06-01 NOTE — ED PROVIDER NOTE - CLINICAL SUMMARY MEDICAL DECISION MAKING FREE TEXT BOX
73F with a h/o asthma, gerd, chronic sinusitis who p/w SOb and wheezes x 3 days associated with dry cough. pt states she has been using her inhalers at home with minimal relief. Never been intubated for her asthma. States she is triggered by dust. No f/c, no cp, no dizziness or syncope.  VSS, pt with diffuses exp wheezing bilaterally, no hypoxia.   Pt with asthma exacerbation. Will treat with nebulized treatments and steroids. Pt does not require bipap or intubation at this time. No clinical evidence of ACS or PE.  Will observe and monitor for improvement.    labs and cxr with no emergent findings. Pt given duonebx x 3, soul-medrol 125mg, 500cc NS, and on reassessment still diffusely wheezy and not feeling well. Will give IV Mag 2g for asthma and admit to F for further mgmt asthma exacerbation.

## 2024-06-01 NOTE — H&P ADULT - PROBLEM SELECTOR PLAN 4
F: tolerating PO, no IVF  E: replete K<4, Mg<2  N: DASH   VTE Prophylaxis: Lovenox 40mg q24h  GI: PPI  C: Full Code  D: RMF Patient with hx of GERD. Home med: Pantoprazole 40mg QD    - c/w home med

## 2024-06-01 NOTE — PATIENT PROFILE ADULT - INTERNATIONAL TRAVEL
HEARING AID CHECK    Patient wearing [employer] Pro mini PITO aids in both ears.    SUBJECTIVE:  Reason for visit: Needs new speaker    OBJECTIVE:  Services provided:   Dispensed supply: wax filters, 8mm double bass domes  Cleaned Hearing Aid Parts: casing, microphone and   Replaced Hearing Aid Parts: , wax filter, dome  Listening Check: both impressions: Working well    ASSESSMENT:  Both aids cleaned and checked and working well. Replaced speaker on right side and patient states aid sounds much better. Replaced retention tails on both aids.    FOLLOW-UP:  Return if further problems.  
No

## 2024-06-01 NOTE — H&P ADULT - NSHPLABSRESULTS_GEN_ALL_CORE
.  LABS:                         14.4   4.33  )-----------( 268      ( 01 Jun 2024 12:15 )             44.7     06-01    144  |  105  |  12  ----------------------------<  94  4.5   |  28  |  0.73    Ca    9.8      01 Jun 2024 12:15    TPro  6.7  /  Alb  4.0  /  TBili  0.5  /  DBili  x   /  AST  21  /  ALT  11  /  AlkPhos  67  06-01      Urinalysis Basic - ( 01 Jun 2024 12:15 )    Color: x / Appearance: x / SG: x / pH: x  Gluc: 94 mg/dL / Ketone: x  / Bili: x / Urobili: x   Blood: x / Protein: x / Nitrite: x   Leuk Esterase: x / RBC: x / WBC x   Sq Epi: x / Non Sq Epi: x / Bacteria: x                RADIOLOGY, EKG & ADDITIONAL TESTS: Reviewed.

## 2024-06-01 NOTE — H&P ADULT - ASSESSMENT
74 y/o F with PMHx of asthma, chronic sinusitis and nasal polyps (s/p endoscopic sinus surgery and polypectomy in 2016), GERD, and HLD presenting for wheezing, productive cough, and shortness of breath for ~1 week found to have asthma exacerbation.

## 2024-06-01 NOTE — ED ADULT TRIAGE NOTE - CHIEF COMPLAINT QUOTE
"I have been feeling short of breath for about 3 days." Hx asthma with minimal relief from inhaler. Audible wheezing heard in triage. Endorses productive cough. Able to speak in full, clear sentences. Denies fevers/chills, chest pain, nausea/vomiting.

## 2024-06-02 LAB
ALBUMIN SERPL ELPH-MCNC: 3.7 G/DL — SIGNIFICANT CHANGE UP (ref 3.3–5)
ALP SERPL-CCNC: 71 U/L — SIGNIFICANT CHANGE UP (ref 40–120)
ALT FLD-CCNC: 8 U/L — LOW (ref 10–45)
ANION GAP SERPL CALC-SCNC: 10 MMOL/L — SIGNIFICANT CHANGE UP (ref 5–17)
AST SERPL-CCNC: 14 U/L — SIGNIFICANT CHANGE UP (ref 10–40)
BASOPHILS # BLD AUTO: 0.03 K/UL — SIGNIFICANT CHANGE UP (ref 0–0.2)
BASOPHILS NFR BLD AUTO: 0.6 % — SIGNIFICANT CHANGE UP (ref 0–2)
BILIRUB SERPL-MCNC: 0.4 MG/DL — SIGNIFICANT CHANGE UP (ref 0.2–1.2)
BUN SERPL-MCNC: 16 MG/DL — SIGNIFICANT CHANGE UP (ref 7–23)
CALCIUM SERPL-MCNC: 9.9 MG/DL — SIGNIFICANT CHANGE UP (ref 8.4–10.5)
CHLORIDE SERPL-SCNC: 105 MMOL/L — SIGNIFICANT CHANGE UP (ref 96–108)
CO2 SERPL-SCNC: 25 MMOL/L — SIGNIFICANT CHANGE UP (ref 22–31)
CREAT SERPL-MCNC: 0.76 MG/DL — SIGNIFICANT CHANGE UP (ref 0.5–1.3)
EGFR: 82 ML/MIN/1.73M2 — SIGNIFICANT CHANGE UP
EOSINOPHIL # BLD AUTO: 0.09 K/UL — SIGNIFICANT CHANGE UP (ref 0–0.5)
EOSINOPHIL NFR BLD AUTO: 1.7 % — SIGNIFICANT CHANGE UP (ref 0–6)
GLUCOSE SERPL-MCNC: 101 MG/DL — HIGH (ref 70–99)
HCT VFR BLD CALC: 43.6 % — SIGNIFICANT CHANGE UP (ref 34.5–45)
HGB BLD-MCNC: 13.6 G/DL — SIGNIFICANT CHANGE UP (ref 11.5–15.5)
IMM GRANULOCYTES NFR BLD AUTO: 0.2 % — SIGNIFICANT CHANGE UP (ref 0–0.9)
LYMPHOCYTES # BLD AUTO: 1.29 K/UL — SIGNIFICANT CHANGE UP (ref 1–3.3)
LYMPHOCYTES # BLD AUTO: 23.8 % — SIGNIFICANT CHANGE UP (ref 13–44)
MAGNESIUM SERPL-MCNC: 2.2 MG/DL — SIGNIFICANT CHANGE UP (ref 1.6–2.6)
MCHC RBC-ENTMCNC: 28.5 PG — SIGNIFICANT CHANGE UP (ref 27–34)
MCHC RBC-ENTMCNC: 31.2 GM/DL — LOW (ref 32–36)
MCV RBC AUTO: 91.2 FL — SIGNIFICANT CHANGE UP (ref 80–100)
MONOCYTES # BLD AUTO: 0.45 K/UL — SIGNIFICANT CHANGE UP (ref 0–0.9)
MONOCYTES NFR BLD AUTO: 8.3 % — SIGNIFICANT CHANGE UP (ref 2–14)
NEUTROPHILS # BLD AUTO: 3.56 K/UL — SIGNIFICANT CHANGE UP (ref 1.8–7.4)
NEUTROPHILS NFR BLD AUTO: 65.4 % — SIGNIFICANT CHANGE UP (ref 43–77)
NRBC # BLD: 0 /100 WBCS — SIGNIFICANT CHANGE UP (ref 0–0)
PHOSPHATE SERPL-MCNC: 3.4 MG/DL — SIGNIFICANT CHANGE UP (ref 2.5–4.5)
PLATELET # BLD AUTO: 266 K/UL — SIGNIFICANT CHANGE UP (ref 150–400)
POTASSIUM SERPL-MCNC: 4 MMOL/L — SIGNIFICANT CHANGE UP (ref 3.5–5.3)
POTASSIUM SERPL-SCNC: 4 MMOL/L — SIGNIFICANT CHANGE UP (ref 3.5–5.3)
PROT SERPL-MCNC: 6.5 G/DL — SIGNIFICANT CHANGE UP (ref 6–8.3)
RBC # BLD: 4.78 M/UL — SIGNIFICANT CHANGE UP (ref 3.8–5.2)
RBC # FLD: 15.1 % — HIGH (ref 10.3–14.5)
SODIUM SERPL-SCNC: 140 MMOL/L — SIGNIFICANT CHANGE UP (ref 135–145)
WBC # BLD: 5.43 K/UL — SIGNIFICANT CHANGE UP (ref 3.8–10.5)
WBC # FLD AUTO: 5.43 K/UL — SIGNIFICANT CHANGE UP (ref 3.8–10.5)

## 2024-06-02 PROCEDURE — 99233 SBSQ HOSP IP/OBS HIGH 50: CPT | Mod: GC

## 2024-06-02 RX ORDER — IPRATROPIUM/ALBUTEROL SULFATE 18-103MCG
3 AEROSOL WITH ADAPTER (GRAM) INHALATION EVERY 4 HOURS
Refills: 0 | Status: DISCONTINUED | OUTPATIENT
Start: 2024-06-02 | End: 2024-06-05

## 2024-06-02 RX ORDER — ACETAMINOPHEN 500 MG
650 TABLET ORAL EVERY 6 HOURS
Refills: 0 | Status: DISCONTINUED | OUTPATIENT
Start: 2024-06-02 | End: 2024-06-05

## 2024-06-02 RX ADMIN — PANTOPRAZOLE SODIUM 40 MILLIGRAM(S): 20 TABLET, DELAYED RELEASE ORAL at 06:22

## 2024-06-02 RX ADMIN — ATORVASTATIN CALCIUM 40 MILLIGRAM(S): 80 TABLET, FILM COATED ORAL at 22:08

## 2024-06-02 RX ADMIN — Medication 2 SPRAY(S): at 17:39

## 2024-06-02 RX ADMIN — ENOXAPARIN SODIUM 40 MILLIGRAM(S): 100 INJECTION SUBCUTANEOUS at 22:08

## 2024-06-02 RX ADMIN — Medication 3 MILLILITER(S): at 17:39

## 2024-06-02 RX ADMIN — Medication 3 MILLILITER(S): at 06:22

## 2024-06-02 RX ADMIN — Medication 3 MILLILITER(S): at 22:08

## 2024-06-02 RX ADMIN — Medication 40 MILLIGRAM(S): at 11:27

## 2024-06-02 RX ADMIN — Medication 600 MILLIGRAM(S): at 13:34

## 2024-06-02 RX ADMIN — Medication 2 SPRAY(S): at 06:22

## 2024-06-02 RX ADMIN — Medication 650 MILLIGRAM(S): at 00:48

## 2024-06-02 RX ADMIN — Medication 650 MILLIGRAM(S): at 01:48

## 2024-06-02 RX ADMIN — Medication 3 MILLILITER(S): at 09:49

## 2024-06-02 RX ADMIN — MONTELUKAST 10 MILLIGRAM(S): 4 TABLET, CHEWABLE ORAL at 11:27

## 2024-06-02 RX ADMIN — Medication 600 MILLIGRAM(S): at 19:20

## 2024-06-02 NOTE — PROGRESS NOTE ADULT - SUBJECTIVE AND OBJECTIVE BOX
OVERNIGHT EVENTS:    SUBJECTIVE / INTERVAL HPI: Patient seen and examined at bedside.     VITAL SIGNS:  Vital Signs Last 24 Hrs  T(C): 36.6 (02 Jun 2024 06:24), Max: 36.7 (01 Jun 2024 11:29)  T(F): 97.8 (02 Jun 2024 06:24), Max: 98.1 (01 Jun 2024 11:29)  HR: 75 (02 Jun 2024 06:24) (75 - 89)  BP: 128/64 (02 Jun 2024 06:24) (128/64 - 164/76)  BP(mean): --  RR: 17 (02 Jun 2024 06:24) (17 - 20)  SpO2: 94% (02 Jun 2024 06:24) (94% - 96%)    Parameters below as of 02 Jun 2024 06:24  Patient On (Oxygen Delivery Method): room air      I&O's Summary      PHYSICAL EXAM:    General: WDWN  HEENT: NC/AT; PERRL, anicteric sclera; MMM  Neck: supple  Cardiovascular: +S1/S2; RRR  Respiratory: CTA B/L; no W/R/R  Gastrointestinal: soft, NT/ND; +BSx4  Extremities: WWP; no edema, clubbing or cyanosis  Vascular: 2+ radial, DP/PT pulses B/L  Neurological: AAOx3; no focal deficits    MEDICATIONS:  MEDICATIONS  (STANDING):  albuterol/ipratropium for Nebulization 3 milliLiter(s) Nebulizer every 6 hours  atorvastatin 40 milliGRAM(s) Oral at bedtime  enoxaparin Injectable 40 milliGRAM(s) SubCutaneous every 24 hours  fluticasone propionate 50 MICROgram(s)/spray Nasal Spray 2 Spray(s) Both Nostrils every 12 hours  montelukast 10 milliGRAM(s) Oral daily  pantoprazole    Tablet 40 milliGRAM(s) Oral before breakfast  predniSONE   Tablet 40 milliGRAM(s) Oral every 24 hours    MEDICATIONS  (PRN):  acetaminophen     Tablet .. 650 milliGRAM(s) Oral every 6 hours PRN Mild Pain (1 - 3), Moderate Pain (4 - 6)      ALLERGIES:  Allergies    No Known Allergies    Intolerances        LABS:                        13.6   5.43  )-----------( 266      ( 02 Jun 2024 05:30 )             43.6     06-02    140  |  105  |  16  ----------------------------<  101<H>  4.0   |  25  |  0.76    Ca    9.9      02 Jun 2024 05:30  Phos  3.4     06-02  Mg     2.2     06-02    TPro  6.5  /  Alb  3.7  /  TBili  0.4  /  DBili  x   /  AST  14  /  ALT  8<L>  /  AlkPhos  71  06-02      Urinalysis Basic - ( 02 Jun 2024 05:30 )    Color: x / Appearance: x / SG: x / pH: x  Gluc: 101 mg/dL / Ketone: x  / Bili: x / Urobili: x   Blood: x / Protein: x / Nitrite: x   Leuk Esterase: x / RBC: x / WBC x   Sq Epi: x / Non Sq Epi: x / Bacteria: x      CAPILLARY BLOOD GLUCOSE          RADIOLOGY & ADDITIONAL TESTS: Reviewed.   OVERNIGHT EVENTS: Tylenol for H/A    SUBJECTIVE / INTERVAL HPI: Patient seen and examined at bedside. Complains of persistent cough, wheezing and congestion. Denies CP, N/V/D    VITAL SIGNS:  Vital Signs Last 24 Hrs  T(C): 36.6 (02 Jun 2024 06:24), Max: 36.7 (01 Jun 2024 11:29)  T(F): 97.8 (02 Jun 2024 06:24), Max: 98.1 (01 Jun 2024 11:29)  HR: 75 (02 Jun 2024 06:24) (75 - 89)  BP: 128/64 (02 Jun 2024 06:24) (128/64 - 164/76)  BP(mean): --  RR: 17 (02 Jun 2024 06:24) (17 - 20)  SpO2: 94% (02 Jun 2024 06:24) (94% - 96%)    Parameters below as of 02 Jun 2024 06:24  Patient On (Oxygen Delivery Method): room air      I&O's Summary      PHYSICAL EXAM:    General: WDWN  HEENT: NC/AT; PERRL, anicteric sclera; MMM  Neck: supple  Cardiovascular: +S1/S2; RRR  Respiratory: Loud rhonchi and wheezing b/l, no iWOB  Gastrointestinal: soft, NT/ND; +BSx4  Extremities: WWP; no edema, clubbing or cyanosis  Vascular: 2+ radial, DP/PT pulses B/L  Neurological: AAOx3; no focal deficits    MEDICATIONS:  MEDICATIONS  (STANDING):  albuterol/ipratropium for Nebulization 3 milliLiter(s) Nebulizer every 6 hours  atorvastatin 40 milliGRAM(s) Oral at bedtime  enoxaparin Injectable 40 milliGRAM(s) SubCutaneous every 24 hours  fluticasone propionate 50 MICROgram(s)/spray Nasal Spray 2 Spray(s) Both Nostrils every 12 hours  montelukast 10 milliGRAM(s) Oral daily  pantoprazole    Tablet 40 milliGRAM(s) Oral before breakfast  predniSONE   Tablet 40 milliGRAM(s) Oral every 24 hours    MEDICATIONS  (PRN):  acetaminophen     Tablet .. 650 milliGRAM(s) Oral every 6 hours PRN Mild Pain (1 - 3), Moderate Pain (4 - 6)      ALLERGIES:  Allergies    No Known Allergies    Intolerances        LABS:                        13.6   5.43  )-----------( 266      ( 02 Jun 2024 05:30 )             43.6     06-02    140  |  105  |  16  ----------------------------<  101<H>  4.0   |  25  |  0.76    Ca    9.9      02 Jun 2024 05:30  Phos  3.4     06-02  Mg     2.2     06-02    TPro  6.5  /  Alb  3.7  /  TBili  0.4  /  DBili  x   /  AST  14  /  ALT  8<L>  /  AlkPhos  71  06-02      Urinalysis Basic - ( 02 Jun 2024 05:30 )    Color: x / Appearance: x / SG: x / pH: x  Gluc: 101 mg/dL / Ketone: x  / Bili: x / Urobili: x   Blood: x / Protein: x / Nitrite: x   Leuk Esterase: x / RBC: x / WBC x   Sq Epi: x / Non Sq Epi: x / Bacteria: x      CAPILLARY BLOOD GLUCOSE          RADIOLOGY & ADDITIONAL TESTS: Reviewed.

## 2024-06-03 DIAGNOSIS — N89.8 OTHER SPECIFIED NONINFLAMMATORY DISORDERS OF VAGINA: ICD-10-CM

## 2024-06-03 LAB
ANION GAP SERPL CALC-SCNC: 11 MMOL/L — SIGNIFICANT CHANGE UP (ref 5–17)
BASOPHILS # BLD AUTO: 0.04 K/UL — SIGNIFICANT CHANGE UP (ref 0–0.2)
BASOPHILS NFR BLD AUTO: 0.7 % — SIGNIFICANT CHANGE UP (ref 0–2)
BLD GP AB SCN SERPL QL: NEGATIVE — SIGNIFICANT CHANGE UP
BUN SERPL-MCNC: 16 MG/DL — SIGNIFICANT CHANGE UP (ref 7–23)
CALCIUM SERPL-MCNC: 10 MG/DL — SIGNIFICANT CHANGE UP (ref 8.4–10.5)
CHLORIDE SERPL-SCNC: 107 MMOL/L — SIGNIFICANT CHANGE UP (ref 96–108)
CO2 SERPL-SCNC: 24 MMOL/L — SIGNIFICANT CHANGE UP (ref 22–31)
CREAT SERPL-MCNC: 0.7 MG/DL — SIGNIFICANT CHANGE UP (ref 0.5–1.3)
EGFR: 90 ML/MIN/1.73M2 — SIGNIFICANT CHANGE UP
EOSINOPHIL # BLD AUTO: 0.09 K/UL — SIGNIFICANT CHANGE UP (ref 0–0.5)
EOSINOPHIL NFR BLD AUTO: 1.6 % — SIGNIFICANT CHANGE UP (ref 0–6)
GLUCOSE SERPL-MCNC: 91 MG/DL — SIGNIFICANT CHANGE UP (ref 70–99)
HCT VFR BLD CALC: 44.9 % — SIGNIFICANT CHANGE UP (ref 34.5–45)
HGB BLD-MCNC: 14.3 G/DL — SIGNIFICANT CHANGE UP (ref 11.5–15.5)
IMM GRANULOCYTES NFR BLD AUTO: 0.4 % — SIGNIFICANT CHANGE UP (ref 0–0.9)
LYMPHOCYTES # BLD AUTO: 1.88 K/UL — SIGNIFICANT CHANGE UP (ref 1–3.3)
LYMPHOCYTES # BLD AUTO: 33.3 % — SIGNIFICANT CHANGE UP (ref 13–44)
MAGNESIUM SERPL-MCNC: 1.9 MG/DL — SIGNIFICANT CHANGE UP (ref 1.6–2.6)
MCHC RBC-ENTMCNC: 28.8 PG — SIGNIFICANT CHANGE UP (ref 27–34)
MCHC RBC-ENTMCNC: 31.8 GM/DL — LOW (ref 32–36)
MCV RBC AUTO: 90.3 FL — SIGNIFICANT CHANGE UP (ref 80–100)
MONOCYTES # BLD AUTO: 0.49 K/UL — SIGNIFICANT CHANGE UP (ref 0–0.9)
MONOCYTES NFR BLD AUTO: 8.7 % — SIGNIFICANT CHANGE UP (ref 2–14)
NEUTROPHILS # BLD AUTO: 3.13 K/UL — SIGNIFICANT CHANGE UP (ref 1.8–7.4)
NEUTROPHILS NFR BLD AUTO: 55.3 % — SIGNIFICANT CHANGE UP (ref 43–77)
NRBC # BLD: 0 /100 WBCS — SIGNIFICANT CHANGE UP (ref 0–0)
PHOSPHATE SERPL-MCNC: 2.4 MG/DL — LOW (ref 2.5–4.5)
PLATELET # BLD AUTO: 280 K/UL — SIGNIFICANT CHANGE UP (ref 150–400)
POTASSIUM SERPL-MCNC: 3.7 MMOL/L — SIGNIFICANT CHANGE UP (ref 3.5–5.3)
POTASSIUM SERPL-SCNC: 3.7 MMOL/L — SIGNIFICANT CHANGE UP (ref 3.5–5.3)
RBC # BLD: 4.97 M/UL — SIGNIFICANT CHANGE UP (ref 3.8–5.2)
RBC # FLD: 15.4 % — HIGH (ref 10.3–14.5)
RH IG SCN BLD-IMP: POSITIVE — SIGNIFICANT CHANGE UP
SODIUM SERPL-SCNC: 142 MMOL/L — SIGNIFICANT CHANGE UP (ref 135–145)
WBC # BLD: 5.65 K/UL — SIGNIFICANT CHANGE UP (ref 3.8–10.5)
WBC # FLD AUTO: 5.65 K/UL — SIGNIFICANT CHANGE UP (ref 3.8–10.5)

## 2024-06-03 PROCEDURE — 99233 SBSQ HOSP IP/OBS HIGH 50: CPT | Mod: GC

## 2024-06-03 RX ORDER — BUDESONIDE AND FORMOTEROL FUMARATE DIHYDRATE 160; 4.5 UG/1; UG/1
2 AEROSOL RESPIRATORY (INHALATION) EVERY 12 HOURS
Refills: 0 | Status: DISCONTINUED | OUTPATIENT
Start: 2024-06-03 | End: 2024-06-05

## 2024-06-03 RX ORDER — POTASSIUM CHLORIDE 20 MEQ
40 PACKET (EA) ORAL ONCE
Refills: 0 | Status: COMPLETED | OUTPATIENT
Start: 2024-06-03 | End: 2024-06-03

## 2024-06-03 RX ADMIN — Medication 3 MILLILITER(S): at 22:45

## 2024-06-03 RX ADMIN — Medication 40 MILLIGRAM(S): at 11:53

## 2024-06-03 RX ADMIN — Medication 600 MILLIGRAM(S): at 06:19

## 2024-06-03 RX ADMIN — BUDESONIDE AND FORMOTEROL FUMARATE DIHYDRATE 2 PUFF(S): 160; 4.5 AEROSOL RESPIRATORY (INHALATION) at 19:01

## 2024-06-03 RX ADMIN — Medication 2 SPRAY(S): at 06:20

## 2024-06-03 RX ADMIN — Medication 3 MILLILITER(S): at 03:17

## 2024-06-03 RX ADMIN — PANTOPRAZOLE SODIUM 40 MILLIGRAM(S): 20 TABLET, DELAYED RELEASE ORAL at 06:19

## 2024-06-03 RX ADMIN — Medication 3 MILLILITER(S): at 19:01

## 2024-06-03 RX ADMIN — Medication 62.5 MILLIMOLE(S): at 11:52

## 2024-06-03 RX ADMIN — ENOXAPARIN SODIUM 40 MILLIGRAM(S): 100 INJECTION SUBCUTANEOUS at 22:45

## 2024-06-03 RX ADMIN — Medication 3 MILLILITER(S): at 15:20

## 2024-06-03 RX ADMIN — Medication 2 SPRAY(S): at 19:01

## 2024-06-03 RX ADMIN — BUDESONIDE AND FORMOTEROL FUMARATE DIHYDRATE 2 PUFF(S): 160; 4.5 AEROSOL RESPIRATORY (INHALATION) at 10:22

## 2024-06-03 RX ADMIN — Medication 3 MILLILITER(S): at 06:19

## 2024-06-03 RX ADMIN — ATORVASTATIN CALCIUM 40 MILLIGRAM(S): 80 TABLET, FILM COATED ORAL at 21:33

## 2024-06-03 RX ADMIN — MONTELUKAST 10 MILLIGRAM(S): 4 TABLET, CHEWABLE ORAL at 11:53

## 2024-06-03 RX ADMIN — Medication 3 MILLILITER(S): at 11:53

## 2024-06-03 RX ADMIN — Medication 40 MILLIEQUIVALENT(S): at 09:54

## 2024-06-03 RX ADMIN — Medication 1200 MILLIGRAM(S): at 19:01

## 2024-06-03 RX ADMIN — Medication 1 APPLICATORFUL: at 07:50

## 2024-06-03 NOTE — PROGRESS NOTE ADULT - SUBJECTIVE AND OBJECTIVE BOX
OVERNIGHT EVENTS:    SUBJECTIVE / INTERVAL HPI: Patient seen and examined at bedside.     VITAL SIGNS:  Vital Signs Last 24 Hrs  T(C): 37.1 (03 Jun 2024 05:53), Max: 37.1 (02 Jun 2024 20:25)  T(F): 98.7 (03 Jun 2024 05:53), Max: 98.8 (02 Jun 2024 20:25)  HR: 75 (03 Jun 2024 05:53) (75 - 88)  BP: 136/77 (03 Jun 2024 05:53) (136/77 - 155/78)  BP(mean): --  RR: 18 (03 Jun 2024 05:53) (17 - 18)  SpO2: 95% (03 Jun 2024 05:53) (95% - 95%)    Parameters below as of 03 Jun 2024 05:53  Patient On (Oxygen Delivery Method): room air      I&O's Summary      PHYSICAL EXAM:    General: WDWN  HEENT: NC/AT; PERRL, anicteric sclera; MMM  Neck: supple  Cardiovascular: +S1/S2; RRR  Respiratory: CTA B/L; no W/R/R  Gastrointestinal: soft, NT/ND; +BSx4  Extremities: WWP; no edema, clubbing or cyanosis  Vascular: 2+ radial, DP/PT pulses B/L  Neurological: AAOx3; no focal deficits    MEDICATIONS:  MEDICATIONS  (STANDING):  albuterol/ipratropium for Nebulization 3 milliLiter(s) Nebulizer every 4 hours  atorvastatin 40 milliGRAM(s) Oral at bedtime  budesonide 160 MICROgram(s)/formoterol 4.5 MICROgram(s) Inhaler 2 Puff(s) Inhalation every 12 hours  clotrimazole 1% Vaginal Cream 1 Applicatorful Vaginal every 24 hours  enoxaparin Injectable 40 milliGRAM(s) SubCutaneous every 24 hours  fluticasone propionate 50 MICROgram(s)/spray Nasal Spray 2 Spray(s) Both Nostrils every 12 hours  guaiFENesin ER 1200 milliGRAM(s) Oral every 12 hours  montelukast 10 milliGRAM(s) Oral daily  pantoprazole    Tablet 40 milliGRAM(s) Oral before breakfast  predniSONE   Tablet 40 milliGRAM(s) Oral every 24 hours    MEDICATIONS  (PRN):  acetaminophen     Tablet .. 650 milliGRAM(s) Oral every 6 hours PRN Mild Pain (1 - 3), Moderate Pain (4 - 6)      ALLERGIES:  Allergies    No Known Allergies    Intolerances        LABS:                        13.6   5.43  )-----------( 266      ( 02 Jun 2024 05:30 )             43.6     06-02    140  |  105  |  16  ----------------------------<  101<H>  4.0   |  25  |  0.76    Ca    9.9      02 Jun 2024 05:30  Phos  3.4     06-02  Mg     2.2     06-02    TPro  6.5  /  Alb  3.7  /  TBili  0.4  /  DBili  x   /  AST  14  /  ALT  8<L>  /  AlkPhos  71  06-02      Urinalysis Basic - ( 02 Jun 2024 05:30 )    Color: x / Appearance: x / SG: x / pH: x  Gluc: 101 mg/dL / Ketone: x  / Bili: x / Urobili: x   Blood: x / Protein: x / Nitrite: x   Leuk Esterase: x / RBC: x / WBC x   Sq Epi: x / Non Sq Epi: x / Bacteria: x      CAPILLARY BLOOD GLUCOSE          RADIOLOGY & ADDITIONAL TESTS: Reviewed.   OVERNIGHT EVENTS: No AOE    SUBJECTIVE / INTERVAL HPI: Patient seen and examined at bedside. States that she still feels congested, though SOB and cough are improved but still present. Denies CP, fever, chills, N/V/D. Also complaining of new vaginal itching    VITAL SIGNS:  Vital Signs Last 24 Hrs  T(C): 37.1 (03 Jun 2024 05:53), Max: 37.1 (02 Jun 2024 20:25)  T(F): 98.7 (03 Jun 2024 05:53), Max: 98.8 (02 Jun 2024 20:25)  HR: 75 (03 Jun 2024 05:53) (75 - 88)  BP: 136/77 (03 Jun 2024 05:53) (136/77 - 155/78)  BP(mean): --  RR: 18 (03 Jun 2024 05:53) (17 - 18)  SpO2: 95% (03 Jun 2024 05:53) (95% - 95%)    Parameters below as of 03 Jun 2024 05:53  Patient On (Oxygen Delivery Method): room air      I&O's Summary      PHYSICAL EXAM:    General: WDWN  HEENT: NC/AT; PERRL, anicteric sclera; MMM  Neck: supple  Cardiovascular: +S1/S2; RRR  Respiratory: Loud rhonchi and wheezing b/l, no iWOB  Gastrointestinal: soft, NT/ND; +BSx4  : No vaginal discharge, small red papules on R labia majora  Extremities: WWP; no edema, clubbing or cyanosis  Vascular: 2+ radial, DP/PT pulses B/L  Neurological: AAOx3; no focal deficits    MEDICATIONS:  MEDICATIONS  (STANDING):  albuterol/ipratropium for Nebulization 3 milliLiter(s) Nebulizer every 4 hours  atorvastatin 40 milliGRAM(s) Oral at bedtime  budesonide 160 MICROgram(s)/formoterol 4.5 MICROgram(s) Inhaler 2 Puff(s) Inhalation every 12 hours  clotrimazole 1% Vaginal Cream 1 Applicatorful Vaginal every 24 hours  enoxaparin Injectable 40 milliGRAM(s) SubCutaneous every 24 hours  fluticasone propionate 50 MICROgram(s)/spray Nasal Spray 2 Spray(s) Both Nostrils every 12 hours  guaiFENesin ER 1200 milliGRAM(s) Oral every 12 hours  montelukast 10 milliGRAM(s) Oral daily  pantoprazole    Tablet 40 milliGRAM(s) Oral before breakfast  predniSONE   Tablet 40 milliGRAM(s) Oral every 24 hours    MEDICATIONS  (PRN):  acetaminophen     Tablet .. 650 milliGRAM(s) Oral every 6 hours PRN Mild Pain (1 - 3), Moderate Pain (4 - 6)      ALLERGIES:  Allergies    No Known Allergies    Intolerances        LABS:                        13.6   5.43  )-----------( 266      ( 02 Jun 2024 05:30 )             43.6     06-02    140  |  105  |  16  ----------------------------<  101<H>  4.0   |  25  |  0.76    Ca    9.9      02 Jun 2024 05:30  Phos  3.4     06-02  Mg     2.2     06-02    TPro  6.5  /  Alb  3.7  /  TBili  0.4  /  DBili  x   /  AST  14  /  ALT  8<L>  /  AlkPhos  71  06-02      Urinalysis Basic - ( 02 Jun 2024 05:30 )    Color: x / Appearance: x / SG: x / pH: x  Gluc: 101 mg/dL / Ketone: x  / Bili: x / Urobili: x   Blood: x / Protein: x / Nitrite: x   Leuk Esterase: x / RBC: x / WBC x   Sq Epi: x / Non Sq Epi: x / Bacteria: x      CAPILLARY BLOOD GLUCOSE          RADIOLOGY & ADDITIONAL TESTS: Reviewed.

## 2024-06-04 DIAGNOSIS — E83.52 HYPERCALCEMIA: ICD-10-CM

## 2024-06-04 DIAGNOSIS — D72.10 EOSINOPHILIA, UNSPECIFIED: ICD-10-CM

## 2024-06-04 LAB
ANION GAP SERPL CALC-SCNC: 13 MMOL/L — SIGNIFICANT CHANGE UP (ref 5–17)
BASOPHILS # BLD AUTO: 0.05 K/UL — SIGNIFICANT CHANGE UP (ref 0–0.2)
BASOPHILS NFR BLD AUTO: 0.7 % — SIGNIFICANT CHANGE UP (ref 0–2)
BLD GP AB SCN SERPL QL: NEGATIVE — SIGNIFICANT CHANGE UP
BUN SERPL-MCNC: 18 MG/DL — SIGNIFICANT CHANGE UP (ref 7–23)
CALCIUM SERPL-MCNC: 10.7 MG/DL — HIGH (ref 8.4–10.5)
CHLORIDE SERPL-SCNC: 105 MMOL/L — SIGNIFICANT CHANGE UP (ref 96–108)
CO2 SERPL-SCNC: 23 MMOL/L — SIGNIFICANT CHANGE UP (ref 22–31)
CREAT SERPL-MCNC: 0.69 MG/DL — SIGNIFICANT CHANGE UP (ref 0.5–1.3)
EGFR: 90 ML/MIN/1.73M2 — SIGNIFICANT CHANGE UP
EOSINOPHIL # BLD AUTO: 0.03 K/UL — SIGNIFICANT CHANGE UP (ref 0–0.5)
EOSINOPHIL NFR BLD AUTO: 0.4 % — SIGNIFICANT CHANGE UP (ref 0–6)
GLUCOSE SERPL-MCNC: 88 MG/DL — SIGNIFICANT CHANGE UP (ref 70–99)
HCT VFR BLD CALC: 47.8 % — HIGH (ref 34.5–45)
HGB BLD-MCNC: 14.8 G/DL — SIGNIFICANT CHANGE UP (ref 11.5–15.5)
IMM GRANULOCYTES NFR BLD AUTO: 0.3 % — SIGNIFICANT CHANGE UP (ref 0–0.9)
LYMPHOCYTES # BLD AUTO: 1.5 K/UL — SIGNIFICANT CHANGE UP (ref 1–3.3)
LYMPHOCYTES # BLD AUTO: 21.6 % — SIGNIFICANT CHANGE UP (ref 13–44)
MAGNESIUM SERPL-MCNC: 2 MG/DL — SIGNIFICANT CHANGE UP (ref 1.6–2.6)
MCHC RBC-ENTMCNC: 28.3 PG — SIGNIFICANT CHANGE UP (ref 27–34)
MCHC RBC-ENTMCNC: 31 GM/DL — LOW (ref 32–36)
MCV RBC AUTO: 91.4 FL — SIGNIFICANT CHANGE UP (ref 80–100)
MONOCYTES # BLD AUTO: 0.57 K/UL — SIGNIFICANT CHANGE UP (ref 0–0.9)
MONOCYTES NFR BLD AUTO: 8.2 % — SIGNIFICANT CHANGE UP (ref 2–14)
NEUTROPHILS # BLD AUTO: 4.77 K/UL — SIGNIFICANT CHANGE UP (ref 1.8–7.4)
NEUTROPHILS NFR BLD AUTO: 68.8 % — SIGNIFICANT CHANGE UP (ref 43–77)
NRBC # BLD: 0 /100 WBCS — SIGNIFICANT CHANGE UP (ref 0–0)
PHOSPHATE SERPL-MCNC: 3.7 MG/DL — SIGNIFICANT CHANGE UP (ref 2.5–4.5)
PLATELET # BLD AUTO: 296 K/UL — SIGNIFICANT CHANGE UP (ref 150–400)
POTASSIUM SERPL-MCNC: 4.1 MMOL/L — SIGNIFICANT CHANGE UP (ref 3.5–5.3)
POTASSIUM SERPL-SCNC: 4.1 MMOL/L — SIGNIFICANT CHANGE UP (ref 3.5–5.3)
RBC # BLD: 5.23 M/UL — HIGH (ref 3.8–5.2)
RBC # FLD: 15.4 % — HIGH (ref 10.3–14.5)
RH IG SCN BLD-IMP: POSITIVE — SIGNIFICANT CHANGE UP
SODIUM SERPL-SCNC: 141 MMOL/L — SIGNIFICANT CHANGE UP (ref 135–145)
WBC # BLD: 6.94 K/UL — SIGNIFICANT CHANGE UP (ref 3.8–10.5)
WBC # FLD AUTO: 6.94 K/UL — SIGNIFICANT CHANGE UP (ref 3.8–10.5)

## 2024-06-04 PROCEDURE — 99233 SBSQ HOSP IP/OBS HIGH 50: CPT | Mod: GC

## 2024-06-04 PROCEDURE — 99222 1ST HOSP IP/OBS MODERATE 55: CPT

## 2024-06-04 RX ADMIN — Medication 1 APPLICATORFUL: at 07:25

## 2024-06-04 RX ADMIN — BUDESONIDE AND FORMOTEROL FUMARATE DIHYDRATE 2 PUFF(S): 160; 4.5 AEROSOL RESPIRATORY (INHALATION) at 07:24

## 2024-06-04 RX ADMIN — BUDESONIDE AND FORMOTEROL FUMARATE DIHYDRATE 2 PUFF(S): 160; 4.5 AEROSOL RESPIRATORY (INHALATION) at 18:11

## 2024-06-04 RX ADMIN — Medication 2 SPRAY(S): at 07:24

## 2024-06-04 RX ADMIN — MONTELUKAST 10 MILLIGRAM(S): 4 TABLET, CHEWABLE ORAL at 12:03

## 2024-06-04 RX ADMIN — Medication 40 MILLIGRAM(S): at 12:03

## 2024-06-04 RX ADMIN — PANTOPRAZOLE SODIUM 40 MILLIGRAM(S): 20 TABLET, DELAYED RELEASE ORAL at 07:23

## 2024-06-04 RX ADMIN — ATORVASTATIN CALCIUM 40 MILLIGRAM(S): 80 TABLET, FILM COATED ORAL at 22:55

## 2024-06-04 RX ADMIN — Medication 1200 MILLIGRAM(S): at 19:19

## 2024-06-04 RX ADMIN — Medication 3 MILLILITER(S): at 16:33

## 2024-06-04 RX ADMIN — Medication 3 MILLILITER(S): at 07:24

## 2024-06-04 RX ADMIN — Medication 1200 MILLIGRAM(S): at 07:23

## 2024-06-04 RX ADMIN — Medication 3 MILLILITER(S): at 12:03

## 2024-06-04 RX ADMIN — Medication 3 MILLILITER(S): at 02:54

## 2024-06-04 RX ADMIN — Medication 2 SPRAY(S): at 18:11

## 2024-06-04 RX ADMIN — ENOXAPARIN SODIUM 40 MILLIGRAM(S): 100 INJECTION SUBCUTANEOUS at 22:56

## 2024-06-04 RX ADMIN — Medication 3 MILLILITER(S): at 23:10

## 2024-06-04 RX ADMIN — Medication 3 MILLILITER(S): at 19:19

## 2024-06-04 NOTE — CONSULT NOTE ADULT - ATTENDING COMMENTS
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 Reports compliance w Advair 500 and montelukast. Suggest ANCA, IgE, Aspergillus Abs, Strongyloides Abs, CT chest exclude infiltrates or bronchiectasis. Pending that eval, she is a good candidate for biologics once this exacerbation is resolved. For now cont steroids and bronchodilators. Can follow w me on discharge for ongoing asthma care.

## 2024-06-04 NOTE — PROGRESS NOTE ADULT - PROBLEM SELECTOR PLAN 4
Patient with h/o GERD. Home med: Pantoprazole 40mg QD    Plan:  - Continue home med
Patient with h/o GERD. Home med: Pantoprazole 40mg QD    Plan:  - Continue home med
Patient with history of chronic sinusitis and nasal polyps (s/p endoscopic sinus surgery and polypectomy in 2016).   -Home meds: mometasone 50mcg spray 2 spays daily     Plan:  - Continue home med

## 2024-06-04 NOTE — CONSULT NOTE ADULT - SUBJECTIVE AND OBJECTIVE BOX
PULMONARY SERVICE INITIAL CONSULT NOTE    HPI:  72 y/o F with PMHx of asthma, chronic sinusitis and nasal polyps (s/p endoscopic sinus surgery and polypectomy in 2016), GERD, and HLD presenting for wheezing, productive cough, and shortness of breath for ~1 week. Patient states she has a hx of asthma exacerbations (not requiring intubation), which are usually triggered by dust. She reports an asthma exacerbation ~8 months ago for which she was hospitalized at [facility], she believes the exacerbation was triggered by construction and subsequent dust in her building. This time around, patient states there was construction going on outside of her building. She denies sick contacts, recent travel, sore throat, runny nose, chest pain, n/v/d.   Home inhalers: Ventolin 2 puffs q6h PRN and Advair 500/50 1puff BID. Patient reports compliance with home inhalers, has been using Ventolin twice daily.      ED Course:  Vitals: T: 98.1, HR: 84, BP: 162/84, RR: 19, SpO2: 96% on RA  Labs: WBC: 4.33, Eosinophils: 1.17, Eosinophil %: 27%. Negtive for COVID, Influenza A/B, RSV   Imaging: CXR   Interventions: magnesium sulfate 2g x1, Solumedrol 125mg IVP x1, 500cc NS bolus x1, Duoneb x3    (01 Jun 2024 17:28)      REVIEW OF SYSTEMS:  Constitutional: No fever, weight loss or fatigue  Eyes: No eye pain, visual disturbances, or discharge  ENMT:  No difficulty hearing, tinnitus, vertigo; No sinus or throat pain  Neck: No pain, stiffness or neck swelling  Respiratory: see HPI  Cardiovascular: No chest pain, palpitations, dizziness or leg swelling  Gastrointestinal: No abdominal or epigastric pain. No nausea, vomiting or hematemesis; No diarrhea or constipation. No melena or hematochezia.  Genitourinary: No dysuria, frequency, hematuria or incontinence  Neurological: No headaches, memory loss, loss of strength, numbness or tremors  Skin: No itching, burning, rashes or lesions   Lymph Nodes: No enlarged glands  Endocrine: No heat or cold intolerance; No hair loss  Musculoskeletal: No joint pain or swelling; No muscle, back or extremity pain  Psychiatric: No depression, anxiety, mood swings or difficulty sleeping  Heme/Lymph: No easy bruising or bleeding gums  Allergy and Immunologic: No hives or eczema    PAST MEDICAL & SURGICAL HISTORY:  Hyperlipidemia      GERD without esophagitis      Breast cyst      Sinusitis      Prediabetes      Asthma      Environmental allergies      History of colonoscopy      H/O total hysterectomy      H/O reduction mammoplasty          FAMILY HISTORY:  No pertinent family history in first degree relatives        SOCIAL HISTORY:  Smoking Status: [ ] Current, [ ] Former, [ ] Never  Pack Years:    MEDICATIONS:  Pulmonary:  albuterol/ipratropium for Nebulization 3 milliLiter(s) Nebulizer every 4 hours  budesonide 160 MICROgram(s)/formoterol 4.5 MICROgram(s) Inhaler 2 Puff(s) Inhalation every 12 hours  guaiFENesin ER 1200 milliGRAM(s) Oral every 12 hours  montelukast 10 milliGRAM(s) Oral daily    Antimicrobials:    Anticoagulants:  enoxaparin Injectable 40 milliGRAM(s) SubCutaneous every 24 hours    Onc:    GI/:  pantoprazole    Tablet 40 milliGRAM(s) Oral before breakfast    Endocrine:  atorvastatin 40 milliGRAM(s) Oral at bedtime  predniSONE   Tablet 40 milliGRAM(s) Oral every 24 hours    Cardiac:    Other Medications:  acetaminophen     Tablet .. 650 milliGRAM(s) Oral every 6 hours PRN  clotrimazole 1% Vaginal Cream 1 Applicatorful Vaginal every 24 hours  fluticasone propionate 50 MICROgram(s)/spray Nasal Spray 2 Spray(s) Both Nostrils every 12 hours      Allergies    No Known Allergies    Intolerances        Vital Signs Last 24 Hrs  T(C): 37.6 (04 Jun 2024 15:48), Max: 37.6 (04 Jun 2024 15:48)  T(F): 99.6 (04 Jun 2024 15:48), Max: 99.6 (04 Jun 2024 15:48)  HR: 76 (04 Jun 2024 12:36) (76 - 79)  BP: 127/69 (04 Jun 2024 12:36) (127/69 - 144/73)  BP(mean): 97 (03 Jun 2024 20:35) (97 - 97)  RR: 18 (04 Jun 2024 12:36) (18 - 19)  SpO2: 96% (04 Jun 2024 12:36) (95% - 96%)    Parameters below as of 04 Jun 2024 12:36  Patient On (Oxygen Delivery Method): room air            PHYSICAL EXAM:  Constitutional: well-appearing  Head: NC/AT  EENT: PERRL, anicteric sclera; oropharynx clear, MMM  Neck: supple, no appreciable JVD  Respiratory: CTA B/L; no W/R/R  Cardiovascular: +S1/S2, RRR  Gastrointestinal: soft, NT/ND  Extremities: WWP; no edema, clubbing or cyanosis  Vascular: 2+ radial pulses B/L  Neurological: awake and alert; MARISCAL    LABS:      CBC Full  -  ( 04 Jun 2024 05:30 )  WBC Count : 6.94 K/uL  RBC Count : 5.23 M/uL  Hemoglobin : 14.8 g/dL  Hematocrit : 47.8 %  Platelet Count - Automated : 296 K/uL  Mean Cell Volume : 91.4 fl  Mean Cell Hemoglobin : 28.3 pg  Mean Cell Hemoglobin Concentration : 31.0 gm/dL  Auto Neutrophil # : 4.77 K/uL  Auto Lymphocyte # : 1.50 K/uL  Auto Monocyte # : 0.57 K/uL  Auto Eosinophil # : 0.03 K/uL  Auto Basophil # : 0.05 K/uL  Auto Neutrophil % : 68.8 %  Auto Lymphocyte % : 21.6 %  Auto Monocyte % : 8.2 %  Auto Eosinophil % : 0.4 %  Auto Basophil % : 0.7 %    06-04    141  |  105  |  18  ----------------------------<  88  4.1   |  23  |  0.69    Ca    10.7<H>      04 Jun 2024 05:30  Phos  3.7     06-04  Mg     2.0     06-04            Urinalysis Basic - ( 04 Jun 2024 05:30 )    Color: x / Appearance: x / SG: x / pH: x  Gluc: 88 mg/dL / Ketone: x  / Bili: x / Urobili: x   Blood: x / Protein: x / Nitrite: x   Leuk Esterase: x / RBC: x / WBC x   Sq Epi: x / Non Sq Epi: x / Bacteria: x                RADIOLOGY & ADDITIONAL STUDIES: PULMONARY SERVICE INITIAL CONSULT NOTE    HPI:  72 y/o F with PMHx of asthma, chronic sinusitis and nasal polyps (s/p endoscopic sinus surgery and polypectomy in 2016), GERD, "genetic rash" that required cauterization of lesions, and HLD presenting for wheezing, productive cough, and shortness of breath for ~1 week. Patient states she has a hx of asthma exacerbations (not requiring intubation), which are usually triggered by dust. She reports an asthma exacerbation ~8 months ago for which she was hospitalized at [facility], she believes the exacerbation was triggered by construction and subsequent dust in her building. This time around, patient states there was construction going on outside of her building. She denies sick contacts, recent travel, sore throat, runny nose, chest pain, n/v/d.   Home inhalers: Ventolin 2 puffs q6h PRN and Advair 500/50 1puff BID. Patient reports compliance with home inhalers, has been using Ventolin twice daily, and nasonex.    ED Course:  Vitals: T: 98.1, HR: 84, BP: 162/84, RR: 19, SpO2: 96% on RA  Labs: WBC: 4.33, Eosinophils: 1.17, Eosinophil %: 27%. Negtive for COVID, Influenza A/B, RSV   Imaging: CXR   Interventions: magnesium sulfate 2g x1, Solumedrol 125mg IVP x1, 500cc NS bolus x1, Duoneb x3    (01 Jun 2024 17:28)      REVIEW OF SYSTEMS:  Constitutional: No fever, weight loss or fatigue  Eyes: No eye pain, visual disturbances, or discharge  ENMT:  No difficulty hearing, tinnitus, vertigo; No sinus or throat pain  Neck: No pain, stiffness or neck swelling  Respiratory: see HPI  Cardiovascular: No chest pain, palpitations, dizziness or leg swelling  Gastrointestinal: No abdominal or epigastric pain. No nausea, vomiting or hematemesis; No diarrhea or constipation. No melena or hematochezia.  Genitourinary: No dysuria, frequency, hematuria or incontinence  Neurological: No headaches, memory loss, loss of strength, numbness or tremors  Skin: itching diffusely throughout  Lymph Nodes: No enlarged glands  Endocrine: No heat or cold intolerance; No hair loss  Musculoskeletal: No joint pain or swelling; No muscle, back or extremity pain  Psychiatric: No depression, anxiety, mood swings or difficulty sleeping  Heme/Lymph: No easy bruising or bleeding gums  Allergy and Immunologic: No hives or eczema    PAST MEDICAL & SURGICAL HISTORY:  Hyperlipidemia      GERD without esophagitis      Breast cyst      Sinusitis      Prediabetes      Asthma      Environmental allergies      History of colonoscopy      H/O total hysterectomy      H/O reduction mammoplasty          FAMILY HISTORY:  No pertinent family history in first degree relatives        SOCIAL HISTORY:  Smoking Status: [ ] Current, [ ] Former, [ ] Never  Pack Years:    MEDICATIONS:  Pulmonary:  albuterol/ipratropium for Nebulization 3 milliLiter(s) Nebulizer every 4 hours  budesonide 160 MICROgram(s)/formoterol 4.5 MICROgram(s) Inhaler 2 Puff(s) Inhalation every 12 hours  guaiFENesin ER 1200 milliGRAM(s) Oral every 12 hours  montelukast 10 milliGRAM(s) Oral daily    Antimicrobials:    Anticoagulants:  enoxaparin Injectable 40 milliGRAM(s) SubCutaneous every 24 hours    Onc:    GI/:  pantoprazole    Tablet 40 milliGRAM(s) Oral before breakfast    Endocrine:  atorvastatin 40 milliGRAM(s) Oral at bedtime  predniSONE   Tablet 40 milliGRAM(s) Oral every 24 hours    Cardiac:    Other Medications:  acetaminophen     Tablet .. 650 milliGRAM(s) Oral every 6 hours PRN  clotrimazole 1% Vaginal Cream 1 Applicatorful Vaginal every 24 hours  fluticasone propionate 50 MICROgram(s)/spray Nasal Spray 2 Spray(s) Both Nostrils every 12 hours      Allergies    No Known Allergies    Intolerances        Vital Signs Last 24 Hrs  T(C): 37.6 (04 Jun 2024 15:48), Max: 37.6 (04 Jun 2024 15:48)  T(F): 99.6 (04 Jun 2024 15:48), Max: 99.6 (04 Jun 2024 15:48)  HR: 76 (04 Jun 2024 12:36) (76 - 79)  BP: 127/69 (04 Jun 2024 12:36) (127/69 - 144/73)  BP(mean): 97 (03 Jun 2024 20:35) (97 - 97)  RR: 18 (04 Jun 2024 12:36) (18 - 19)  SpO2: 96% (04 Jun 2024 12:36) (95% - 96%)    Parameters below as of 04 Jun 2024 12:36  Patient On (Oxygen Delivery Method): room air            PHYSICAL EXAM:  Constitutional: well-appearing  Head: NC/AT  EENT: PERRL, anicteric sclera; oropharynx clear, MMM  Neck: supple, no appreciable JVD  Respiratory: CTA B/L; no W/R/R  Cardiovascular: +S1/S2, RRR  Gastrointestinal: soft, NT/ND  Extremities: WWP; no edema, clubbing or cyanosis  Vascular: 2+ radial pulses B/L  Neurological: awake and alert; MARISCAL    LABS:      CBC Full  -  ( 04 Jun 2024 05:30 )  WBC Count : 6.94 K/uL  RBC Count : 5.23 M/uL  Hemoglobin : 14.8 g/dL  Hematocrit : 47.8 %  Platelet Count - Automated : 296 K/uL  Mean Cell Volume : 91.4 fl  Mean Cell Hemoglobin : 28.3 pg  Mean Cell Hemoglobin Concentration : 31.0 gm/dL  Auto Neutrophil # : 4.77 K/uL  Auto Lymphocyte # : 1.50 K/uL  Auto Monocyte # : 0.57 K/uL  Auto Eosinophil # : 0.03 K/uL  Auto Basophil # : 0.05 K/uL  Auto Neutrophil % : 68.8 %  Auto Lymphocyte % : 21.6 %  Auto Monocyte % : 8.2 %  Auto Eosinophil % : 0.4 %  Auto Basophil % : 0.7 %    06-04    141  |  105  |  18  ----------------------------<  88  4.1   |  23  |  0.69    Ca    10.7<H>      04 Jun 2024 05:30  Phos  3.7     06-04  Mg     2.0     06-04            Urinalysis Basic - ( 04 Jun 2024 05:30 )    Color: x / Appearance: x / SG: x / pH: x  Gluc: 88 mg/dL / Ketone: x  / Bili: x / Urobili: x   Blood: x / Protein: x / Nitrite: x   Leuk Esterase: x / RBC: x / WBC x   Sq Epi: x / Non Sq Epi: x / Bacteria: x                RADIOLOGY & ADDITIONAL STUDIES: PULMONARY SERVICE INITIAL CONSULT NOTE    HPI:  72 y/o F with PMHx of asthma, chronic sinusitis and nasal polyps (s/p endoscopic sinus surgery and polypectomy in 2016), GERD, "genetic rash" that required cauterization of lesions, and HLD presenting for wheezing, productive cough, and shortness of breath for ~1 week. Patient states she has a hx of asthma exacerbations (not requiring intubation), which are usually triggered by dust. She reports an asthma exacerbation ~8 months ago for which she was hospitalized at [facility], she believes the exacerbation was triggered by construction and subsequent dust in her building. This time around, patient states there was construction going on outside of her building. She denies sick contacts, recent travel, sore throat, runny nose, chest pain, n/v/d.   Home inhalers: Ventolin 2 puffs q6h PRN and Advair 500/50 1puff BID. Patient reports compliance with home inhalers, has been using Ventolin twice daily, and nasonex. ALso on singulair   She has no lung doctor  non smoker no pets   Has not travelled  Worked in hospital [employer]    ED Course:  Vitals: T: 98.1, HR: 84, BP: 162/84, RR: 19, SpO2: 96% on RA  Labs: WBC: 4.33, Eosinophils: 1.17, Eosinophil %: 27%. Negtive for COVID, Influenza A/B, RSV   Imaging: CXR   Interventions: magnesium sulfate 2g x1, Solumedrol 125mg IVP x1, 500cc NS bolus x1, Duoneb x3    (01 Jun 2024 17:28)      REVIEW OF SYSTEMS:  Constitutional: No fever, weight loss or fatigue  Eyes: No eye pain, visual disturbances, or discharge  ENMT:  No difficulty hearing, tinnitus, vertigo; No sinus or throat pain  Neck: No pain, stiffness or neck swelling  Respiratory: see HPI  Cardiovascular: No chest pain, palpitations, dizziness or leg swelling  Gastrointestinal: No abdominal or epigastric pain. No nausea, vomiting or hematemesis; No diarrhea or constipation. No melena or hematochezia.  Genitourinary: No dysuria, frequency, hematuria or incontinence  Neurological: No headaches, memory loss, loss of strength, numbness or tremors  Skin: itching diffusely throughout  Lymph Nodes: No enlarged glands  Endocrine: No heat or cold intolerance; No hair loss  Musculoskeletal: No joint pain or swelling; No muscle, back or extremity pain  Psychiatric: No depression, anxiety, mood swings or difficulty sleeping  Heme/Lymph: No easy bruising or bleeding gums  Allergy and Immunologic: No hives or eczema    PAST MEDICAL & SURGICAL HISTORY:  Hyperlipidemia      GERD without esophagitis      Breast cyst      Sinusitis      Prediabetes      Asthma      Environmental allergies      History of colonoscopy      H/O total hysterectomy      H/O reduction mammoplasty          FAMILY HISTORY:  No pertinent family history in first degree relatives        SOCIAL HISTORY:  Smoking Status: [ ] Current, [ ] Former, [ ] Never  Pack Years:    MEDICATIONS:  Pulmonary:  albuterol/ipratropium for Nebulization 3 milliLiter(s) Nebulizer every 4 hours  budesonide 160 MICROgram(s)/formoterol 4.5 MICROgram(s) Inhaler 2 Puff(s) Inhalation every 12 hours  guaiFENesin ER 1200 milliGRAM(s) Oral every 12 hours  montelukast 10 milliGRAM(s) Oral daily    Antimicrobials:    Anticoagulants:  enoxaparin Injectable 40 milliGRAM(s) SubCutaneous every 24 hours    Onc:    GI/:  pantoprazole    Tablet 40 milliGRAM(s) Oral before breakfast    Endocrine:  atorvastatin 40 milliGRAM(s) Oral at bedtime  predniSONE   Tablet 40 milliGRAM(s) Oral every 24 hours    Cardiac:    Other Medications:  acetaminophen     Tablet .. 650 milliGRAM(s) Oral every 6 hours PRN  clotrimazole 1% Vaginal Cream 1 Applicatorful Vaginal every 24 hours  fluticasone propionate 50 MICROgram(s)/spray Nasal Spray 2 Spray(s) Both Nostrils every 12 hours      Allergies    No Known Allergies    Intolerances        Vital Signs Last 24 Hrs  T(C): 37.6 (04 Jun 2024 15:48), Max: 37.6 (04 Jun 2024 15:48)  T(F): 99.6 (04 Jun 2024 15:48), Max: 99.6 (04 Jun 2024 15:48)  HR: 76 (04 Jun 2024 12:36) (76 - 79)  BP: 127/69 (04 Jun 2024 12:36) (127/69 - 144/73)  BP(mean): 97 (03 Jun 2024 20:35) (97 - 97)  RR: 18 (04 Jun 2024 12:36) (18 - 19)  SpO2: 96% (04 Jun 2024 12:36) (95% - 96%)    Parameters below as of 04 Jun 2024 12:36  Patient On (Oxygen Delivery Method): room air            PHYSICAL EXAM:  Constitutional: well-appearing  Head: NC/AT  EENT: PERRL, anicteric sclera; oropharynx clear, MMM  Neck: supple, no appreciable JVD  Respiratory: CTA B/L; Wheezing   Cardiovascular: +S1/S2, RRR  Gastrointestinal: soft, NT/ND  Extremities: WWP; no edema, clubbing or cyanosis  Vascular: 2+ radial pulses B/L  Neurological: awake and alert; MARISCAL    LABS:      CBC Full  -  ( 04 Jun 2024 05:30 )  WBC Count : 6.94 K/uL  RBC Count : 5.23 M/uL  Hemoglobin : 14.8 g/dL  Hematocrit : 47.8 %  Platelet Count - Automated : 296 K/uL  Mean Cell Volume : 91.4 fl  Mean Cell Hemoglobin : 28.3 pg  Mean Cell Hemoglobin Concentration : 31.0 gm/dL  Auto Neutrophil # : 4.77 K/uL  Auto Lymphocyte # : 1.50 K/uL  Auto Monocyte # : 0.57 K/uL  Auto Eosinophil # : 0.03 K/uL  Auto Basophil # : 0.05 K/uL  Auto Neutrophil % : 68.8 %  Auto Lymphocyte % : 21.6 %  Auto Monocyte % : 8.2 %  Auto Eosinophil % : 0.4 %  Auto Basophil % : 0.7 %    06-04    141  |  105  |  18  ----------------------------<  88  4.1   |  23  |  0.69    Ca    10.7<H>      04 Jun 2024 05:30  Phos  3.7     06-04  Mg     2.0     06-04            Urinalysis Basic - ( 04 Jun 2024 05:30 )    Color: x / Appearance: x / SG: x / pH: x  Gluc: 88 mg/dL / Ketone: x  / Bili: x / Urobili: x   Blood: x / Protein: x / Nitrite: x   Leuk Esterase: x / RBC: x / WBC x   Sq Epi: x / Non Sq Epi: x / Bacteria: x                RADIOLOGY & ADDITIONAL STUDIES:    CXR 6/4/2024  no infiltrate

## 2024-06-04 NOTE — PROGRESS NOTE ADULT - PROBLEM SELECTOR PLAN 3
Patient with history of HLD. Home med: atorvastatin 40mg QD    Plan:  -Continue home med
Ca 10.7 on 5/4 AM. I/S/O respiratory difficulty/asthma symptoms, c/f sarcoidosis.    Plan:  -Follow up PTHrP, PTH, ionized Ca, ACE  -Trend Ca
Patient with history of HLD. Home med: atorvastatin 40mg QD    Plan:  -Continue home med

## 2024-06-04 NOTE — PROGRESS NOTE ADULT - PROBLEM SELECTOR PLAN 7
Complaining of vaginal itching on 6/3 AM. On exam, red papules on R labia majora, no discharge.    Plan:  - Clotrimazole 1% vaginal cream q24h

## 2024-06-04 NOTE — PROGRESS NOTE ADULT - SUBJECTIVE AND OBJECTIVE BOX
OVERNIGHT EVENTS:    SUBJECTIVE / INTERVAL HPI: Patient seen and examined at bedside.     VITAL SIGNS:  Vital Signs Last 24 Hrs  T(C): 36.9 (04 Jun 2024 06:05), Max: 37.2 (03 Jun 2024 13:21)  T(F): 98.4 (04 Jun 2024 06:05), Max: 98.9 (03 Jun 2024 13:21)  HR: 78 (04 Jun 2024 06:05) (66 - 79)  BP: 138/69 (04 Jun 2024 06:05) (138/69 - 146/81)  BP(mean): 97 (03 Jun 2024 20:35) (97 - 97)  RR: 19 (04 Jun 2024 06:05) (18 - 19)  SpO2: 95% (04 Jun 2024 06:05) (95% - 96%)    Parameters below as of 04 Jun 2024 06:05  Patient On (Oxygen Delivery Method): room air      I&O's Summary      PHYSICAL EXAM:    General: WDWN  HEENT: NC/AT; PERRL, anicteric sclera; MMM  Neck: supple  Cardiovascular: +S1/S2; RRR  Respiratory: CTA B/L; no W/R/R  Gastrointestinal: soft, NT/ND; +BSx4  Extremities: WWP; no edema, clubbing or cyanosis  Vascular: 2+ radial, DP/PT pulses B/L  Neurological: AAOx3; no focal deficits    MEDICATIONS:  MEDICATIONS  (STANDING):  albuterol/ipratropium for Nebulization 3 milliLiter(s) Nebulizer every 4 hours  atorvastatin 40 milliGRAM(s) Oral at bedtime  budesonide 160 MICROgram(s)/formoterol 4.5 MICROgram(s) Inhaler 2 Puff(s) Inhalation every 12 hours  clotrimazole 1% Vaginal Cream 1 Applicatorful Vaginal every 24 hours  enoxaparin Injectable 40 milliGRAM(s) SubCutaneous every 24 hours  fluticasone propionate 50 MICROgram(s)/spray Nasal Spray 2 Spray(s) Both Nostrils every 12 hours  guaiFENesin ER 1200 milliGRAM(s) Oral every 12 hours  montelukast 10 milliGRAM(s) Oral daily  pantoprazole    Tablet 40 milliGRAM(s) Oral before breakfast  predniSONE   Tablet 40 milliGRAM(s) Oral every 24 hours    MEDICATIONS  (PRN):  acetaminophen     Tablet .. 650 milliGRAM(s) Oral every 6 hours PRN Mild Pain (1 - 3), Moderate Pain (4 - 6)      ALLERGIES:  Allergies    No Known Allergies    Intolerances        LABS:                        14.8   6.94  )-----------( 296      ( 04 Jun 2024 05:30 )             47.8     06-04    141  |  105  |  18  ----------------------------<  88  4.1   |  23  |  0.69    Ca    10.7<H>      04 Jun 2024 05:30  Phos  3.7     06-04  Mg     2.0     06-04        Urinalysis Basic - ( 04 Jun 2024 05:30 )    Color: x / Appearance: x / SG: x / pH: x  Gluc: 88 mg/dL / Ketone: x  / Bili: x / Urobili: x   Blood: x / Protein: x / Nitrite: x   Leuk Esterase: x / RBC: x / WBC x   Sq Epi: x / Non Sq Epi: x / Bacteria: x      CAPILLARY BLOOD GLUCOSE          RADIOLOGY & ADDITIONAL TESTS: Reviewed.   OVERNIGHT EVENTS: No AOE    SUBJECTIVE / INTERVAL HPI: Patient seen and examined at bedside. States that cough/SOB has improved, but still complains of chest congestion. Denies CP, N/V/D    VITAL SIGNS:  Vital Signs Last 24 Hrs  T(C): 36.9 (04 Jun 2024 06:05), Max: 37.2 (03 Jun 2024 13:21)  T(F): 98.4 (04 Jun 2024 06:05), Max: 98.9 (03 Jun 2024 13:21)  HR: 78 (04 Jun 2024 06:05) (66 - 79)  BP: 138/69 (04 Jun 2024 06:05) (138/69 - 146/81)  BP(mean): 97 (03 Jun 2024 20:35) (97 - 97)  RR: 19 (04 Jun 2024 06:05) (18 - 19)  SpO2: 95% (04 Jun 2024 06:05) (95% - 96%)    Parameters below as of 04 Jun 2024 06:05  Patient On (Oxygen Delivery Method): room air      I&O's Summary      PHYSICAL EXAM:    General: WDWN  HEENT: NC/AT; PERRL, anicteric sclera; MMM  Neck: supple  Cardiovascular: +S1/S2; RRR  Respiratory: Loud rhonchi and wheezing b/l, no iWOB  Gastrointestinal: soft, NT/ND; +BSx4  : No vaginal discharge, small red papules on R labia majora  Extremities: WWP; no edema, clubbing or cyanosis  Vascular: 2+ radial, DP/PT pulses B/L  Neurological: AAOx3; no focal deficits    MEDICATIONS:  MEDICATIONS  (STANDING):  albuterol/ipratropium for Nebulization 3 milliLiter(s) Nebulizer every 4 hours  atorvastatin 40 milliGRAM(s) Oral at bedtime  budesonide 160 MICROgram(s)/formoterol 4.5 MICROgram(s) Inhaler 2 Puff(s) Inhalation every 12 hours  clotrimazole 1% Vaginal Cream 1 Applicatorful Vaginal every 24 hours  enoxaparin Injectable 40 milliGRAM(s) SubCutaneous every 24 hours  fluticasone propionate 50 MICROgram(s)/spray Nasal Spray 2 Spray(s) Both Nostrils every 12 hours  guaiFENesin ER 1200 milliGRAM(s) Oral every 12 hours  montelukast 10 milliGRAM(s) Oral daily  pantoprazole    Tablet 40 milliGRAM(s) Oral before breakfast  predniSONE   Tablet 40 milliGRAM(s) Oral every 24 hours    MEDICATIONS  (PRN):  acetaminophen     Tablet .. 650 milliGRAM(s) Oral every 6 hours PRN Mild Pain (1 - 3), Moderate Pain (4 - 6)      ALLERGIES:  Allergies    No Known Allergies    Intolerances        LABS:                        14.8   6.94  )-----------( 296      ( 04 Jun 2024 05:30 )             47.8     06-04    141  |  105  |  18  ----------------------------<  88  4.1   |  23  |  0.69    Ca    10.7<H>      04 Jun 2024 05:30  Phos  3.7     06-04  Mg     2.0     06-04        Urinalysis Basic - ( 04 Jun 2024 05:30 )    Color: x / Appearance: x / SG: x / pH: x  Gluc: 88 mg/dL / Ketone: x  / Bili: x / Urobili: x   Blood: x / Protein: x / Nitrite: x   Leuk Esterase: x / RBC: x / WBC x   Sq Epi: x / Non Sq Epi: x / Bacteria: x      CAPILLARY BLOOD GLUCOSE          RADIOLOGY & ADDITIONAL TESTS: Reviewed.

## 2024-06-04 NOTE — PROGRESS NOTE ADULT - PROBLEM SELECTOR PLAN 5
F: Tolerating PO, no IVF  E: Replete K<4, Mg<2  N: DASH   VTE Prophylaxis: Lovenox 40mg q24h  GI: PPI  C: Full Code  D: RMF
Patient with history of HLD. Home med: atorvastatin 40mg QD    Plan:  -Continue home med
Complaining of vaginal itching on 6/3 AM. On exam, red papules on R labia majora, no discharge.    Plan:  - Clotrimazole 1% vaginal cream q24h

## 2024-06-04 NOTE — CONSULT NOTE ADULT - ASSESSMENT
74 y/o F with PMHx of asthma, chronic sinusitis and nasal polyps (s/p endoscopic sinus surgery and polypectomy in 2016), GERD, "genetic rash" that required cauterization of lesions, and HLD presenting for wheezing, productive cough, and shortness of breath admitted for Asthma exacerbation for the third time in 8 months, Pulm consulted for persistent symptoms.      #Uncontrolled asthma  -Home inhalers: Ventolin 2 puffs q6h PRN and Advair 500/50 1puff BID, singulair Patient reports compliance with home inhalers, and has been using albuterol throughout the day  -Despite wheezing, she is reporting Improving with steroids, and Duonebs, symbicort 160/4.5, Guaifen   -c/w current treatment  -Found with eosinophilia, atopic dermatitis, Hx of chronic sinusitis May  benefit from Dupixent to start outpatient with pulm follow up with Dr. Russell    #Peripheral Eosinophilia   Likely in the setting of asthma vs steroids  rule out other causes such as ABPA, Vasculits,  parasitic infection  Please obtain c-ANCA, p-ANCA, IgE serum, Aspergillus igM and IgG, IgE skin test, Serum igE, strongyloid antibodies.       72 y/o F with PMHx of asthma, chronic sinusitis and nasal polyps (s/p endoscopic sinus surgery and polypectomy in 2016), GERD, "genetic rash" that required cauterization of lesions, and HLD presenting for wheezing, productive cough, and shortness of breath admitted for Asthma exacerbation for the third time in 8 months, Pulm consulted for persistent symptoms.      #Uncontrolled asthma  -Home inhalers: Ventolin 2 puffs q6h PRN and Advair 500/50 1puff BID, singulair Patient reports compliance with home inhalers, and has been using albuterol throughout the day  -Despite wheezing, she is reporting Improving with steroids, and Duonebs, symbicort 160/4.5, Guaifen   -c/w current treatment  -Found with eosinophilia, atopic dermatitis, Hx of chronic sinusitis May  benefit from Dupixent to start outpatient with pulm follow up with Dr. Russell    #Peripheral Eosinophilia   Likely in the setting of asthma, other eosinophilic processes  rule out other causes such as ABPA, Vasculits,  parasitic infection, eosinophilic PNA   Please obtain c-ANCA, p-ANCA, IgE serum, Aspergillus igM and IgG,  strongyloid antibodies, MAI  Please obtain CT chest non con     Dc plan  pulm follow up with Dr. Russell for possible biologics

## 2024-06-04 NOTE — PROGRESS NOTE ADULT - PROBLEM SELECTOR PLAN 6
Patient with h/o GERD. Home med: Pantoprazole 40mg QD    Plan:  - Continue home med
F: Tolerating PO, no IVF  E: Replete K<4, Mg<2  N: DASH   VTE Prophylaxis: Lovenox 40mg q24h  GI: PPI  C: Full Code  D: RMF

## 2024-06-04 NOTE — PROGRESS NOTE ADULT - PROBLEM SELECTOR PLAN 1
Patient with history of asthma, presenting with wheezing, productive cough, and shortness of breath for ~1 week. She reports experiencing asthma exacerbations in the past (no intubations). Most recently, 8 months ago at [facility]. Patient believes the construction and dust outside of her building exacerbated her asthma. She denies sick contacts, sore throat runny nose but endorses a productive cough. On admission, CXR clear, negative for COVID-19, RSV, and Influenza A/B. RVP negative. No elevated WBC. No iWOB, satting well on RA. Low concern for infectious process at this time.  -Home meds: Ventolin 2 puffs q6h PRN. Advair 500/50 1 puff BID, Montelukast 10mg QD  -6/3: Doubled Mucinex and added Symbicort i/s/o continued congestion    Plan:  - Continue prednisone 40mg x 4d course (1 dose of Solumedrol given in ER)  - Start Symbicort 2 puffs BID (TI for home Advair)  - Duonebs 3mL q4h standing  - Mucinex 1200mg PO BID
Patient with history of asthma, presenting with wheezing, productive cough, and shortness of breath for ~1 week. She reports experiencing asthma exacerbations in the past (no intubations). Most recently, 8 months ago at [facility]. Patient believes the construction and dust outside of her building exacerbated her asthma. She denies sick contacts, sore throat runny nose but endorses a productive cough. On admission, CXR clear, negative for COVID-19, RSV, and Influenza A/B. RVP negative. No elevated WBC. No iWOB, satting well on RA. Low concern for infectious process at this time.  -Home meds: Ventolin 2 puffs q6h PRN. Advair 500/50 1 puff BID, Montelukast 10mg QD  -6/3: Doubled Mucinex and added Symbicort i/s/o continued congestion    Plan:  - Continue prednisone 40mg x 4d course (1 dose of Solumedrol given in ER) - last day 6/5  - Continue Symbicort 2 puffs BID (TI for home Advair)  - Duonebs 3mL q4h standing  - Mucinex 1200mg PO BID  - Pulmonology consulted, appreciate recommendations       - May benefit from [employer] outpatient (follow up w/ Dr. Russell)
Patient with history of asthma, presenting with wheezing, productive cough, and shortness of breath for ~1 week. She reports experiencing asthma exacerbations in the past (no intubations). Most recently, 8 months ago at [facility]. Patient believes the construction and dust outside of her building exacerbated her asthma. She denies sick contacts, sore throat runny nose but endorses a productive cough. On admission, CXR clear, negative for COVID-19, RSV, and Influenza A/B. RVP negative. No elevated WBC. No iWOB, satting well on RA. Low concern for infectious process at this time   -Home meds: Ventolin 2 puffs q6h PRN. Advair 500/50 1 puff BID, Montelukast 10mg QD    Plan:  - Continue prednisone 40mg x 4d course (1 dose of Solumedrol given in ER)  - Duonebs 3mL q4h standing  - Mucinex 600mg PO BID

## 2024-06-04 NOTE — PROGRESS NOTE ADULT - PROBLEM SELECTOR PLAN 2
Patient w/ 27% eosinophils on CBC on admission, now s/p steroids (confounding). I/S/O uncontrolled asthma, differential includes ABPA, vasculitis, parasitic infection.    Plan:  -Obtain CT chest w/ IV contrast  -Follow up ANCA, Strongyloides, Aspergillus, serum IgE
Patient with history of chronic sinusitis and nasal polyps (s/p endoscopic sinus surgery and polypectomy in 2016).   -Home meds: mometasone 50mcg spray 2 spays daily     Plan:  - Continue home med
Patient with history of chronic sinusitis and nasal polyps (s/p endoscopic sinus surgery and polypectomy in 2016).   -Home meds: mometasone 50mcg spray 2 spays daily     Plan:  - Continue home med

## 2024-06-05 ENCOUNTER — TRANSCRIPTION ENCOUNTER (OUTPATIENT)
Age: 74
End: 2024-06-05

## 2024-06-05 VITALS — WEIGHT: 147.93 LBS

## 2024-06-05 LAB
ANION GAP SERPL CALC-SCNC: 9 MMOL/L — SIGNIFICANT CHANGE UP (ref 5–17)
BASOPHILS # BLD AUTO: 0.08 K/UL — SIGNIFICANT CHANGE UP (ref 0–0.2)
BASOPHILS NFR BLD AUTO: 1.2 % — SIGNIFICANT CHANGE UP (ref 0–2)
BUN SERPL-MCNC: 19 MG/DL — SIGNIFICANT CHANGE UP (ref 7–23)
CALCIUM SERPL-MCNC: 10.3 MG/DL — SIGNIFICANT CHANGE UP (ref 8.4–10.5)
CALCIUM SERPL-MCNC: 10.5 MG/DL — SIGNIFICANT CHANGE UP (ref 8.4–10.5)
CHLORIDE SERPL-SCNC: 104 MMOL/L — SIGNIFICANT CHANGE UP (ref 96–108)
CO2 SERPL-SCNC: 27 MMOL/L — SIGNIFICANT CHANGE UP (ref 22–31)
CREAT SERPL-MCNC: 0.77 MG/DL — SIGNIFICANT CHANGE UP (ref 0.5–1.3)
EGFR: 80 ML/MIN/1.73M2 — SIGNIFICANT CHANGE UP
EOSINOPHIL # BLD AUTO: 0.05 K/UL — SIGNIFICANT CHANGE UP (ref 0–0.5)
EOSINOPHIL NFR BLD AUTO: 0.7 % — SIGNIFICANT CHANGE UP (ref 0–6)
GLUCOSE SERPL-MCNC: 84 MG/DL — SIGNIFICANT CHANGE UP (ref 70–99)
HCT VFR BLD CALC: 45.8 % — HIGH (ref 34.5–45)
HGB BLD-MCNC: 14.4 G/DL — SIGNIFICANT CHANGE UP (ref 11.5–15.5)
IMM GRANULOCYTES NFR BLD AUTO: 0.6 % — SIGNIFICANT CHANGE UP (ref 0–0.9)
LYMPHOCYTES # BLD AUTO: 1.81 K/UL — SIGNIFICANT CHANGE UP (ref 1–3.3)
LYMPHOCYTES # BLD AUTO: 26.4 % — SIGNIFICANT CHANGE UP (ref 13–44)
MAGNESIUM SERPL-MCNC: 1.9 MG/DL — SIGNIFICANT CHANGE UP (ref 1.6–2.6)
MCHC RBC-ENTMCNC: 28.6 PG — SIGNIFICANT CHANGE UP (ref 27–34)
MCHC RBC-ENTMCNC: 31.4 GM/DL — LOW (ref 32–36)
MCV RBC AUTO: 90.9 FL — SIGNIFICANT CHANGE UP (ref 80–100)
MONOCYTES # BLD AUTO: 0.62 K/UL — SIGNIFICANT CHANGE UP (ref 0–0.9)
MONOCYTES NFR BLD AUTO: 9 % — SIGNIFICANT CHANGE UP (ref 2–14)
NEUTROPHILS # BLD AUTO: 4.26 K/UL — SIGNIFICANT CHANGE UP (ref 1.8–7.4)
NEUTROPHILS NFR BLD AUTO: 62.1 % — SIGNIFICANT CHANGE UP (ref 43–77)
NRBC # BLD: 0 /100 WBCS — SIGNIFICANT CHANGE UP (ref 0–0)
PHOSPHATE SERPL-MCNC: 4 MG/DL — SIGNIFICANT CHANGE UP (ref 2.5–4.5)
PLATELET # BLD AUTO: 276 K/UL — SIGNIFICANT CHANGE UP (ref 150–400)
POTASSIUM SERPL-MCNC: 4 MMOL/L — SIGNIFICANT CHANGE UP (ref 3.5–5.3)
POTASSIUM SERPL-SCNC: 4 MMOL/L — SIGNIFICANT CHANGE UP (ref 3.5–5.3)
PTH-INTACT FLD-MCNC: 42 PG/ML — SIGNIFICANT CHANGE UP (ref 15–65)
RBC # BLD: 5.04 M/UL — SIGNIFICANT CHANGE UP (ref 3.8–5.2)
RBC # FLD: 15.6 % — HIGH (ref 10.3–14.5)
SODIUM SERPL-SCNC: 140 MMOL/L — SIGNIFICANT CHANGE UP (ref 135–145)
WBC # BLD: 6.86 K/UL — SIGNIFICANT CHANGE UP (ref 3.8–10.5)
WBC # FLD AUTO: 6.86 K/UL — SIGNIFICANT CHANGE UP (ref 3.8–10.5)

## 2024-06-05 PROCEDURE — 86036 ANCA SCREEN EACH ANTIBODY: CPT

## 2024-06-05 PROCEDURE — 0225U NFCT DS DNA&RNA 21 SARSCOV2: CPT

## 2024-06-05 PROCEDURE — 80053 COMPREHEN METABOLIC PANEL: CPT

## 2024-06-05 PROCEDURE — 83735 ASSAY OF MAGNESIUM: CPT

## 2024-06-05 PROCEDURE — 83880 ASSAY OF NATRIURETIC PEPTIDE: CPT

## 2024-06-05 PROCEDURE — 99239 HOSP IP/OBS DSCHRG MGMT >30: CPT | Mod: GC

## 2024-06-05 PROCEDURE — 82164 ANGIOTENSIN I ENZYME TEST: CPT

## 2024-06-05 PROCEDURE — 71045 X-RAY EXAM CHEST 1 VIEW: CPT

## 2024-06-05 PROCEDURE — 82785 ASSAY OF IGE: CPT

## 2024-06-05 PROCEDURE — 82330 ASSAY OF CALCIUM: CPT

## 2024-06-05 PROCEDURE — 87637 SARSCOV2&INF A&B&RSV AMP PRB: CPT

## 2024-06-05 PROCEDURE — 84100 ASSAY OF PHOSPHORUS: CPT

## 2024-06-05 PROCEDURE — 99285 EMERGENCY DEPT VISIT HI MDM: CPT

## 2024-06-05 PROCEDURE — 86900 BLOOD TYPING SEROLOGIC ABO: CPT

## 2024-06-05 PROCEDURE — 99232 SBSQ HOSP IP/OBS MODERATE 35: CPT | Mod: GC

## 2024-06-05 PROCEDURE — 36415 COLL VENOUS BLD VENIPUNCTURE: CPT

## 2024-06-05 PROCEDURE — 94640 AIRWAY INHALATION TREATMENT: CPT

## 2024-06-05 PROCEDURE — 83519 RIA NONANTIBODY: CPT

## 2024-06-05 PROCEDURE — 86682 HELMINTH ANTIBODY: CPT

## 2024-06-05 PROCEDURE — 86850 RBC ANTIBODY SCREEN: CPT

## 2024-06-05 PROCEDURE — 96374 THER/PROPH/DIAG INJ IV PUSH: CPT

## 2024-06-05 PROCEDURE — 71260 CT THORAX DX C+: CPT | Mod: 26

## 2024-06-05 PROCEDURE — 83970 ASSAY OF PARATHORMONE: CPT

## 2024-06-05 PROCEDURE — 82310 ASSAY OF CALCIUM: CPT

## 2024-06-05 PROCEDURE — 86901 BLOOD TYPING SEROLOGIC RH(D): CPT

## 2024-06-05 PROCEDURE — 93005 ELECTROCARDIOGRAM TRACING: CPT

## 2024-06-05 PROCEDURE — 71260 CT THORAX DX C+: CPT | Mod: MC

## 2024-06-05 PROCEDURE — 85025 COMPLETE CBC W/AUTO DIFF WBC: CPT

## 2024-06-05 PROCEDURE — 86606 ASPERGILLUS ANTIBODY: CPT

## 2024-06-05 PROCEDURE — 80048 BASIC METABOLIC PNL TOTAL CA: CPT

## 2024-06-05 RX ORDER — ATORVASTATIN CALCIUM 80 MG/1
1 TABLET, FILM COATED ORAL
Qty: 0 | Refills: 0 | DISCHARGE

## 2024-06-05 RX ADMIN — Medication 3 MILLILITER(S): at 03:14

## 2024-06-05 RX ADMIN — Medication 1 APPLICATORFUL: at 06:51

## 2024-06-05 RX ADMIN — Medication 3 MILLILITER(S): at 07:10

## 2024-06-05 RX ADMIN — Medication 3 MILLILITER(S): at 12:13

## 2024-06-05 RX ADMIN — Medication 40 MILLIGRAM(S): at 12:13

## 2024-06-05 RX ADMIN — PANTOPRAZOLE SODIUM 40 MILLIGRAM(S): 20 TABLET, DELAYED RELEASE ORAL at 07:01

## 2024-06-05 RX ADMIN — Medication 3 MILLILITER(S): at 15:48

## 2024-06-05 RX ADMIN — BUDESONIDE AND FORMOTEROL FUMARATE DIHYDRATE 2 PUFF(S): 160; 4.5 AEROSOL RESPIRATORY (INHALATION) at 06:50

## 2024-06-05 RX ADMIN — Medication 1200 MILLIGRAM(S): at 06:49

## 2024-06-05 RX ADMIN — Medication 2 SPRAY(S): at 06:50

## 2024-06-05 RX ADMIN — MONTELUKAST 10 MILLIGRAM(S): 4 TABLET, CHEWABLE ORAL at 12:13

## 2024-06-05 NOTE — DISCHARGE NOTE NURSING/CASE MANAGEMENT/SOCIAL WORK - NSDCPEFALRISK_GEN_ALL_CORE
For information on Fall & Injury Prevention, visit: [email or web address] OR  [email or web address] OR  https://www.cdc.gov/steadi/patient.html

## 2024-06-05 NOTE — DIETITIAN INITIAL EVALUATION ADULT - OTHER INFO
73F with PMH of asthma, chronic sinusitis, nasal polyps, GERD, and HLD who presented for wheezing, productive cough, and SOB x1 week, found to have asthma exacerbation, admitted for work-up and management.     Pt seen on 7UR for assessment. Labs and medication orders reviewed. Ordered for prednisone. Electrolytes WNL. On DASH/TLC diet. Pt reports good appetite and intake, endorses intake increased from baseline in-house. Reports UBW ~147lb-148lb, consistent with admission wt 148lb/67.1kg, pt endorses wt stability PTA. Denies nausea/vomiting/diarrhea/constipation, reports last BM yesterday 6/4. Pt denies difficulty chewing/swallowing. Confirms no known food allergies. No Oriental orthodox/ethnic/cultural food preferences noted. No pressure injuries or edema documented, Savage score 21. See nutrition recommendations. RD to remain available.

## 2024-06-05 NOTE — PROGRESS NOTE ADULT - ATTENDING COMMENTS
Patient was seen and examined at bedside on 6/4/2024 at 330 pm. Patient reports that she feels mostly the same. Denies abdominal pain, SOB, CP, N/V. ROS is otherwise negative. Vitals, labwork and pertinent imaging reviewed. Exam - NAD, AAO x 4, PERRLA, EOMI, MMM, supple neck, chest - rhonchi b/l, CV - rrr, s1s2, no m/r/g, abd - soft, NTND, + BS, ext - wwp, skin - no rash    Plan:  -C/w Prednisone  -Given hypercalcemia, eosinophilia minimal improvement will consult Pulm  -Send hypercalcemia workup
72 y/o F with PMHx of asthma, chronic sinusitis and nasal polyps (s/p endoscopic sinus surgery and polypectomy in 2016), GERD, and HLD presenting for wheezing, productive cough, and shortness of breath for ~1 week found to have asthma exacerbation.     Labs and imaging reviewed    Problem List  #Asthma Exacerbation  #Chronic Sinusititis  #Nasal Polyps   #GERD    Plan  -Cont supportive therapy including steroids  -Wonder given sinusitis and polyps whether patient has ASA sensitivity  -No O2 needed    Anticipate D/C within 24h
Uncontrolled severe asthma, 3 exacerbations in last year, easily triggered by dust/construction and now found to have eosinophilia. Likely atopic asthma and would benefit from biologic. Eosinophilic work up sent, serology pending, CT chest without ABPA, consolidations, or infection. Only has bronchial wall thickening and air trapping consistent with airway disease. Continue with at least 5 days of prednisone, PRN nebulizer, advair high dose BID, singulair. Will need out patient pulm follow up with Dr. Russell - of note, patient moving to [place] in next few weeks and may not be able to start biologic therapy prior to move. Instructed to ensure she has local pulmonologist in [place] to follow up with as well if she moves before hospital discharge follow up.
72 y/o F with PMHx of asthma, chronic sinusitis and nasal polyps (s/p endoscopic sinus surgery and polypectomy in 2016), GERD, and HLD presenting for wheezing, productive cough, and shortness of breath for ~1 week found to have asthma exacerbation.     Labs and imaging reviewed    Problem List  #Asthma Exacerbation  #Chronic Sinusititis  #Nasal Polyps   #GERD    Plan  -Cont supportive therapy including steroids  -Wonder given sinusitis and polyps whether patient has ASA sensitivity  -No O2 needed    Anticipate D/C within 24h

## 2024-06-05 NOTE — DIETITIAN INITIAL EVALUATION ADULT - PROBLEM SELECTOR PLAN 2
Patient with hx of chronic sinusitis and nasal polyps (s/p endoscopic sinus surgery and polypectomy in 2016).   Home meds: mometasone ate 50mcg spray 2 spays daily     - c/w home med

## 2024-06-05 NOTE — PROGRESS NOTE ADULT - SUBJECTIVE AND OBJECTIVE BOX
OVERNIGHT EVENTS:    SUBJECTIVE / INTERVAL HPI: Patient seen and examined at bedside.     VITAL SIGNS:  Vital Signs Last 24 Hrs  T(C): 36.4 (05 Jun 2024 05:19), Max: 37.6 (04 Jun 2024 15:48)  T(F): 97.6 (05 Jun 2024 05:19), Max: 99.6 (04 Jun 2024 15:48)  HR: 78 (05 Jun 2024 05:19) (74 - 78)  BP: 130/69 (05 Jun 2024 05:19) (127/69 - 130/69)  BP(mean): --  RR: 18 (05 Jun 2024 05:19) (18 - 18)  SpO2: 93% (05 Jun 2024 05:19) (93% - 96%)    Parameters below as of 05 Jun 2024 05:19  Patient On (Oxygen Delivery Method): room air      I&O's Summary      PHYSICAL EXAM:    General: WDWN  HEENT: NC/AT; PERRL, anicteric sclera; MMM  Neck: supple  Cardiovascular: +S1/S2; RRR  Respiratory: CTA B/L; no W/R/R  Gastrointestinal: soft, NT/ND; +BSx4  Extremities: WWP; no edema, clubbing or cyanosis  Vascular: 2+ radial, DP/PT pulses B/L  Neurological: AAOx3; no focal deficits    MEDICATIONS:  MEDICATIONS  (STANDING):  albuterol/ipratropium for Nebulization 3 milliLiter(s) Nebulizer every 4 hours  atorvastatin 40 milliGRAM(s) Oral at bedtime  budesonide 160 MICROgram(s)/formoterol 4.5 MICROgram(s) Inhaler 2 Puff(s) Inhalation every 12 hours  clotrimazole 1% Vaginal Cream 1 Applicatorful Vaginal every 24 hours  enoxaparin Injectable 40 milliGRAM(s) SubCutaneous every 24 hours  fluticasone propionate 50 MICROgram(s)/spray Nasal Spray 2 Spray(s) Both Nostrils every 12 hours  guaiFENesin ER 1200 milliGRAM(s) Oral every 12 hours  montelukast 10 milliGRAM(s) Oral daily  pantoprazole    Tablet 40 milliGRAM(s) Oral before breakfast  predniSONE   Tablet 40 milliGRAM(s) Oral every 24 hours    MEDICATIONS  (PRN):  acetaminophen     Tablet .. 650 milliGRAM(s) Oral every 6 hours PRN Mild Pain (1 - 3), Moderate Pain (4 - 6)      ALLERGIES:  Allergies    No Known Allergies    Intolerances        LABS:                        14.4   6.86  )-----------( 276      ( 05 Jun 2024 05:30 )             45.8     06-05    140  |  104  |  19  ----------------------------<  84  4.0   |  27  |  0.77    Ca    10.3      05 Jun 2024 05:30  Phos  4.0     06-05  Mg     1.9     06-05        Urinalysis Basic - ( 05 Jun 2024 05:30 )    Color: x / Appearance: x / SG: x / pH: x  Gluc: 84 mg/dL / Ketone: x  / Bili: x / Urobili: x   Blood: x / Protein: x / Nitrite: x   Leuk Esterase: x / RBC: x / WBC x   Sq Epi: x / Non Sq Epi: x / Bacteria: x      CAPILLARY BLOOD GLUCOSE          RADIOLOGY & ADDITIONAL TESTS: Reviewed.

## 2024-06-05 NOTE — DIETITIAN INITIAL EVALUATION ADULT - ENTER TO (ML/KG)
Nephrology Progress Note  [phone]   [email or web address]    Patient:  Ignacia Oleary   : 1961    CC:  CAMI/CKD   History of NSTMI,PAF,CABAG 2018,  anoxic brain  Injury, CAMI/CKD required dialysis some months ago. Subjective:  [facility] [place] for sternal wound care and now transferred here for neurosurgery evaluation. He has a a baseline Cr. Of  2.1, now higher as he has was diuresed,  His K was elevated on admission but is better now, his Cr is coming down but still marked azotemia. He is lying flay denies dyspnea. ROS:   No dyspnea ,no pain , no N/V    SHx:  No visitors     Meds:  Reviewed     Vitals:  BP 96/68   Pulse 88   Temp 97.4 °F (36.3 °C) (Oral)   Resp 16   Ht 5' 10\" (1.778 m)   Wt 243 lb 9.7 oz (110.5 kg)   SpO2 95%   BMI 34.95 kg/m²     Physical Exam:  Gen: Resting in bed, NAD. HEENT: MMM, OP clear. CV: RRR no rub noted   Lungs: good respiratory effort and clear air entry   Abd: S/NT +BS  Ext: No edema, no cyanosis  Skin: Warm. No rashes appreciated. Labs:  CBC:   Lab Results   Component Value Date    WBC 6.9 2019    RBC 3.42 2019    HGB 8.4 2019    HCT 28.0 2019    MCV 82.0 2019    MCH 24.5 2019    MCHC 29.9 2019    RDW 22.6 2019     2019    MPV 7.6 2019     BMP:    Lab Results   Component Value Date     2019    K 5.0 2019    K 6.1 2019    CL 99 2019    CO2 24 2019     2019    LABALBU 2.8 2019    CREATININE 3.0 2019    CALCIUM 9.3 2019    GFRAA 26 2019    LABGLOM 21 2019    GLUCOSE 110 2019    GLUCOSE 78 2019       Assessment/Plan:  1. CAMI/CKD ,would hold diuretics for now and monitor he has marked azotemia and was on lasix, may have been over diuresed. Will give gentle  IV fluids and monitor, no need for dialysis at present.     2. Hyperkalemia better 35

## 2024-06-05 NOTE — PROGRESS NOTE ADULT - ASSESSMENT
72 y/o F with PMHx of asthma, chronic sinusitis and nasal polyps (s/p endoscopic sinus surgery and polypectomy in 2016), GERD, "genetic rash" that required cauterization of lesions, and HLD presenting for wheezing, productive cough, and shortness of breath admitted for Asthma exacerbation for the third time in 8 months, Pulm consulted for persistent symptoms.  Of note, peripheral eosinopholia present with 1170 Eos. She has chronic sinusitis and polyps, raising concerning for ANCA vasculitis and eosinophilic pneumonia. Other differentials such as ABPA possible. Will obtain further workup as outlined below.   CT reviewed, small amount of mucus in LLL and calcified nodule in RML. No concerning features at this time for eosinophilic pneumonia.       #Uncontrolled, severe asthma, T2 phenotype   -Home inhalers: Ventolin 2 puffs q6h PRN and Advair 500/50 1puff BID, singulair Patient reports compliance with home inhalers, and has been using albuterol throughout the day  -Despite wheezing, she is reporting Improving with steroids, and Duonebs, symbicort 160/4.5, Guaifenesin   -c/w current treatment given clinical improvement  -Found with eosinophilia, atopic dermatitis, Hx of chronic sinusitis May  benefit from Dupixent to start outpatient. Arrange follow up with Dr. Russell.   - can complete steroids today since she is clinically improved  - obtain airway clearance device such as aerobika    #Peripheral Eosinophilia   Likely in the setting of asthma, other eosinophilic processes  rule out other causes such as ABPA, Vasculits,  parasitic infection, eosinophilic PNA   Please obtain c-ANCA, p-ANCA, IgE serum, Aspergillus igM and IgG,  strongyloid antibodies, MAI  Can follow up for results with Dr. Russell    Discharge plan:  - Pulm follow up with Dr. Russell for possible biologic initiation, PFTs  - Review serology and vasculitis workup, sent as inpatient.     Patient S/E/D with Dr. Liu.    Thank you for the interesting consult.  [facility]  Pulmonary team will sign off. Please call, page or place new consult with any new questions.     Dmitry Ashton MD  PCCM Fellow       
74 YO F with PMH of asthma, chronic sinusitis and nasal polyps (s/p endoscopic sinus surgery and polypectomy in 2016), GERD, and HLD presenting for wheezing, productive cough, and shortness of breath for ~1 week found to have asthma exacerbation. 
74 YO F with PMH of asthma, chronic sinusitis and nasal polyps (s/p endoscopic sinus surgery and polypectomy in 2016), GERD, and HLD presenting for wheezing, productive cough, and shortness of breath for ~1 week found to have asthma exacerbation. 
72 YO F with PMH of asthma, chronic sinusitis and nasal polyps (s/p endoscopic sinus surgery and polypectomy in 2016), GERD, and HLD presenting for wheezing, productive cough, and shortness of breath for ~1 week found to have asthma exacerbation, now pending further workup for uncontrolled asthma and eosinophilia per pulm.

## 2024-06-05 NOTE — PROGRESS NOTE ADULT - SUBJECTIVE AND OBJECTIVE BOX
PULMONARY CONSULT SERVICE FOLLOW-UP NOTE    INTERVAL HPI:  Reviewed chart and overnight events; patient seen and examined at bedside. No new complaints. Feels clinically improved. Has some congestion with difficult to clear phlegm.     MEDICATIONS:  Pulmonary:  albuterol/ipratropium for Nebulization 3 milliLiter(s) Nebulizer every 4 hours  budesonide 160 MICROgram(s)/formoterol 4.5 MICROgram(s) Inhaler 2 Puff(s) Inhalation every 12 hours  guaiFENesin ER 1200 milliGRAM(s) Oral every 12 hours  montelukast 10 milliGRAM(s) Oral daily    Antimicrobials:    Anticoagulants:  enoxaparin Injectable 40 milliGRAM(s) SubCutaneous every 24 hours    Cardiac:      Allergies    No Known Allergies    Intolerances        Vital Signs Last 24 Hrs  T(C): 36.8 (05 Jun 2024 12:52), Max: 37.6 (04 Jun 2024 15:48)  T(F): 98.3 (05 Jun 2024 12:52), Max: 99.6 (04 Jun 2024 15:48)  HR: 82 (05 Jun 2024 12:52) (74 - 82)  BP: 116/67 (05 Jun 2024 12:52) (116/67 - 130/69)  BP(mean): --  RR: 18 (05 Jun 2024 12:52) (18 - 18)  SpO2: 96% (05 Jun 2024 12:52) (93% - 96%)    Parameters below as of 05 Jun 2024 12:52  Patient On (Oxygen Delivery Method): room air            PHYSICAL EXAM:  Constitutional: NAD  HEENT: NC/AT; PERRL, anicteric sclera; MMM  Neck: supple  Cardiovascular: +S1/S2, RRR  Respiratory: Bilateral wheeze appreciated  Gastrointestinal: soft, NT/ND  Extremities: WWP; no edema, clubbing or cyanosis  Vascular: 2+ radial pulses B/L  Neurological: awake and alert; MARISCAL    LABS:      CBC Full  -  ( 05 Jun 2024 05:30 )  WBC Count : 6.86 K/uL  RBC Count : 5.04 M/uL  Hemoglobin : 14.4 g/dL  Hematocrit : 45.8 %  Platelet Count - Automated : 276 K/uL  Mean Cell Volume : 90.9 fl  Mean Cell Hemoglobin : 28.6 pg  Mean Cell Hemoglobin Concentration : 31.4 gm/dL  Auto Neutrophil # : 4.26 K/uL  Auto Lymphocyte # : 1.81 K/uL  Auto Monocyte # : 0.62 K/uL  Auto Eosinophil # : 0.05 K/uL  Auto Basophil # : 0.08 K/uL  Auto Neutrophil % : 62.1 %  Auto Lymphocyte % : 26.4 %  Auto Monocyte % : 9.0 %  Auto Eosinophil % : 0.7 %  Auto Basophil % : 1.2 %    06-05    140  |  104  |  19  ----------------------------<  84  4.0   |  27  |  0.77    Ca    10.3      05 Jun 2024 05:30  Phos  4.0     06-05  Mg     1.9     06-05            Urinalysis Basic - ( 05 Jun 2024 05:30 )    Color: x / Appearance: x / SG: x / pH: x  Gluc: 84 mg/dL / Ketone: x  / Bili: x / Urobili: x   Blood: x / Protein: x / Nitrite: x   Leuk Esterase: x / RBC: x / WBC x   Sq Epi: x / Non Sq Epi: x / Bacteria: x                RADIOLOGY & ADDITIONAL STUDIES:  < from: CT Chest w/ IV Cont (06.05.24 @ 09:48) >  LUNGS AND AIRWAYS: Patent central airways. Slight mosaic pattern of   attenuation in the the pulmonary parenchyma. Mild atelectasis left lower   lobe. No pulmonary consolidation or suspicious nodules. Punctate   calcified granuloma right middle lobe.    < end of copied text >

## 2024-06-05 NOTE — DISCHARGE NOTE NURSING/CASE MANAGEMENT/SOCIAL WORK - PATIENT PORTAL LINK FT
You can access the FollowMyHealth Patient Portal offered by [facility] by registering at the following website: [email or web address]. By joining [employer]’s FollowMyHealth portal, you will also be able to view your health information using other applications (apps) compatible with our system.

## 2024-06-05 NOTE — DISCHARGE NOTE PROVIDER - NSDCMRMEDTOKEN_GEN_ALL_CORE_FT
Advair Diskus 500 mcg-50 mcg inhalation powder: 1 puff(s) inhaled 2 times a day  atorvastatin 40 mg oral tablet: 1 orally  mometasone 50 mcg/inh nasal spray: 2 spray(s) in each nostril once a day  montelukast 10 mg oral tablet: 1 tab(s) orally once a day  pantoprazole 40 mg oral delayed release tablet: 1 tab(s) orally once a day  Ventolin HFA 90 mcg/inh inhalation aerosol: 2 puff(s) inhaled every 6 hours as needed for  wheezing   Advair Diskus 500 mcg-50 mcg inhalation powder: 1 puff(s) inhaled 2 times a day  atorvastatin 40 mg oral tablet: 1 orally  clotrimazole 1% vaginal cream with applicator: 1 applicatorful vaginal every 24 hours  guaiFENesin 1200 mg oral tablet, extended release: 1 tab(s) orally every 12 hours  mometasone 50 mcg/inh nasal spray: 2 spray(s) in each nostril once a day  montelukast 10 mg oral tablet: 1 tab(s) orally once a day  pantoprazole 40 mg oral delayed release tablet: 1 tab(s) orally once a day  Ventolin HFA 90 mcg/inh inhalation aerosol: 2 puff(s) inhaled every 6 hours as needed for  wheezing   Advair Diskus 500 mcg-50 mcg inhalation powder: 1 puff(s) inhaled 2 times a day  Aerobika: Use your Aerobika once in the morning and once in the evening.  atorvastatin 40 mg oral tablet: 1 tablet orally once a day (at bedtime)  clotrimazole 1% vaginal cream with applicator: 1 applicatorful vaginal every 24 hours  guaiFENesin 1200 mg oral tablet, extended release: 1 tab(s) orally every 12 hours  mometasone 50 mcg/inh nasal spray: 2 spray(s) in each nostril once a day  montelukast 10 mg oral tablet: 1 tab(s) orally once a day  pantoprazole 40 mg oral delayed release tablet: 1 tab(s) orally once a day  Ventolin HFA 90 mcg/inh inhalation aerosol: 2 puff(s) inhaled every 6 hours as needed for  wheezing

## 2024-06-05 NOTE — DISCHARGE NOTE PROVIDER - NSDCFUSCHEDAPPT_GEN_ALL_CORE_FT
[facility]  [address]  Scheduled Appointment: 06/28/2024

## 2024-06-05 NOTE — DIETITIAN INITIAL EVALUATION ADULT - PROBLEM SELECTOR PLAN 1
Patient with hx of asthma, presenting with wheezing, productive cough, and shortness of breath for ~1 week. She reports experiencing asthma exacerbations in the past (no intubations). Most recently, 8 months ago at [facility].   Home meds: Ventolin 2 puffs q6h PRN. Advair 500/50 1 puff BID, Montelukast 10mg QD  Patient believes the construction and dust outside of her building exacerbated her asthma. She denies sick contacts, sore throat runny nose but endorses a productive cough.   On admission- CXR clear, negative for COVID-19, RSV, and Influenza A/B. No elevated WBC. No iWOB, satting well on RA. Low concern for infectious process at this time     - start prednisone 40mg x4 days (got 1 dose of solumedrol in ED)  - duoneb q6h  - f/u full RVP

## 2024-06-05 NOTE — DISCHARGE NOTE PROVIDER - CARE PROVIDER_API CALL
Jes Russell  Critical Care Medicine  [address]  [place] [address]  Phone: [phone]  Fax: [phone]  Follow Up Time: 2 weeks   Jes Russell  Critical Care Medicine  [address]  [place] [address]  Phone: [phone]  Fax: [phone]  Scheduled Appointment: 06/28/2024 10:00 AM

## 2024-06-05 NOTE — DIETITIAN INITIAL EVALUATION ADULT - OTHER CALCULATIONS
Estimated needs based on dosing wt as within % IBW 115lb/52.3kg (129%). Needs adjusted for age, BMI, and clinical status.

## 2024-06-05 NOTE — DISCHARGE NOTE PROVIDER - HOSPITAL COURSE
72 YO F with PMH of asthma, chronic sinusitis and nasal polyps (s/p endoscopic sinus surgery and polypectomy in 2016), GERD, and HLD presenting for wheezing, productive cough, and shortness of breath for ~1 week found to have asthma exacerbation, now pending further workup for uncontrolled asthma and eosinophilia per pulmonology.    Problem List/Main Diagnoses:     1. Asthma exacerbation.   -Patient with history of asthma, presenting with wheezing, productive cough, and shortness of breath for ~1 week. She reports experiencing asthma exacerbations in the past (no intubations). Most recently, 8 months ago at [facility]. Patient believes the construction and dust outside of her building exacerbated her asthma. She denies sick contacts, sore throat runny nose but endorses a productive cough. On admission, CXR clear, negative for COVID-19, RSV, and Influenza A/B. RVP negative. No elevated WBC. No iWOB, satting well on RA. Low concern for infectious process at this time. Pulmonology consulted for intractable asthma. Now s/p 5d course of steroids, Symbicort 2 puffs BID, Duonebs 3mL q4h standing, Mucinex 1200mg PO BID.  -Home meds: Ventolin 2 puffs q6h PRN. Advair 500/50 1 puff BID, Montelukast 10mg QD  -6/3: Doubled Mucinex and added Symbicort i/s/o continued congestion  Plan:  - Resume home Advair, ventolin, montelukast on discharge  - Start Mucinex 1200mg PO BID at home  - Follow up outpatient with pulmonologist Dr. Russell (may benefit from [facility] outpatient)    2. Eosinophilia.   -Patient w/ 27% eosinophils on CBC on admission, now s/p steroids (confounding). I/S/O uncontrolled asthma, differential includes ABPA, vasculitis, parasitic infection.  Plan:  -Obtain CT chest w/ IV contrast  -Follow up ANCA, Strongyloides, Aspergillus, serum IgE.    3. Hypercalcemia.   -Ca 10.7 on 5/4 AM. I/S/O respiratory difficulty/asthma symptoms, c/f sarcoidosis.  Plan:  -Follow up PTHrP, PTH, ionized Ca, ACE  -Trend Ca    4. H/O chronic sinusitis.   -Patient with history of chronic sinusitis and nasal polyps (s/p endoscopic sinus surgery and polypectomy in 2016).   -Home meds: mometasone 50mcg spray 2 spays daily   Plan:  - Continue home med    5. Hyperlipidemia.   -Patient with history of HLD. Home med: atorvastatin 40mg QD  Plan:  -Continue home med    6. GERD (gastroesophageal reflux disease).   -Patient with h/o GERD. Home med: Pantoprazole 40mg QD  Plan:  - Continue home med    7. Vaginal itching.   -Complaining of vaginal itching on 6/3 AM. On exam, red papules on R labia majora, no discharge.  Plan:  - Start clotrimazole 1% vaginal cream q24h    New medications/therapies: Clotrimazole 1% vaginal cream q24h, Mucinex 1200mg PO BID  New lines/hardware: None  Labs to be followed outpatient: ANCA, Strongyloides, Aspergillus, serum IgE, PTHrP, ionized Ca, PTH, ACE  Exam to be followed outpatient: Pulmonology, PCP  Discharge plan: discharge to home    Physical Exam Upon Discharge:  General: WDWN  HEENT: NC/AT; PERRL, anicteric sclera; MMM  Neck: supple  Cardiovascular: +S1/S2; RRR  Respiratory: Loud rhonchi and wheezing b/l, no iWOB  Gastrointestinal: soft, NT/ND; +BSx4  : No vaginal discharge, small red papules on R labia majora  Extremities: WWP; no edema, clubbing or cyanosis  Vascular: 2+ radial, DP/PT pulses B/L  Neurological: AAOx3; no focal deficits

## 2024-06-05 NOTE — DISCHARGE NOTE PROVIDER - ATTENDING DISCHARGE PHYSICAL EXAMINATION:
Patient was seen and examined at bedside on 6/5/2024 at 1230 pm. Patient reports that she feels improved. Denies abdominal pain, CP, N/V. ROS is otherwise negative. Vitals, labwork and pertinent imaging reviewed. Exam - NAD, AAO x 4, PERRLA, EOMI, MMM, supple neck, chest - improved rhonchi b/l, CV - rrr, s1s2, no m/r/g, abd - soft, NTND, + BS, ext - wwp, skin - no rash    Plan:  -C/w Prednisone  -Pulm consulted - suspect atopic asthma, rec outpatient follow up with possible initiation of biologic  -Patient is medically ready for d/c

## 2024-06-05 NOTE — DISCHARGE NOTE PROVIDER - NSDCCPCAREPLAN_GEN_ALL_CORE_FT
PRINCIPAL DISCHARGE DIAGNOSIS  Diagnosis: Acute asthma exacerbation  Assessment and Plan of Treatment: You were found to have a worsening, or exacerbation, or your asthma. This was likely triggered by particles from the construction site near where you live. You were treated with steroids and your home medications here at the hospital. A futher workup to determine the cause of your asthma was also done. You will need to take your home medications, as well as some new ones, outside the hospital, and to follow up with Dr. Russell.  INSTRUCTIONS:  1. Please be sure to wear an N95 mask around the construction site to prevent yourself from breathing in particles.  2. Start taking 1200mg of Mucinex once in the morning and once in the evning to help clear up your chest congestion.  3. Continue to take your home Ventolin, Advair, and montelukast as prescribed.  4. Follow up outpatient with Dr. Russell, a pulmonologist (lung doctor).      SECONDARY DISCHARGE DIAGNOSES  Diagnosis: Vaginal itching  Assessment and Plan of Treatment: You were found to have vaginal itching while in the hospital. This may have been caused by local irritation or by a common fungal infection.  INSTRUCTIONS:  1. Apply clotrimazole 1% cream to the affected area once in the morning and once in the evening until the itchiness resolves.     PRINCIPAL DISCHARGE DIAGNOSIS  Diagnosis: Acute asthma exacerbation  Assessment and Plan of Treatment: You were found to have a worsening, or exacerbation, or your asthma. This was likely triggered by particles from the construction site near where you live. You were treated with steroids and your home medications here at the hospital. A futher workup to determine the cause of your asthma was also done. You will need to take your home medications, as well as some new ones, outside the hospital, and to follow up with Dr. Russell.  INSTRUCTIONS:  1. Please be sure to wear an N95 mask around the construction site to prevent yourself from breathing in particles.  2. Start taking 1200mg of Mucinex once in the morning and once in the evning to help clear up your chest congestion.  3. Continue to take your home Ventolin, Advair, and montelukast as prescribed.  4. Use your Aerobika device to help clear up the congestion.  5. Follow up outpatient with Dr. Russell, a pulmonologist (lung doctor).      SECONDARY DISCHARGE DIAGNOSES  Diagnosis: Vaginal itching  Assessment and Plan of Treatment: You were found to have vaginal itching while in the hospital. This may have been caused by local irritation or by a common fungal infection.  INSTRUCTIONS:  1. Apply clotrimazole 1% cream to the affected area once a day until the itchiness resolves.     PRINCIPAL DISCHARGE DIAGNOSIS  Diagnosis: Acute asthma exacerbation  Assessment and Plan of Treatment: You were found to have a worsening, or exacerbation, or your asthma. This was likely triggered by particles from the construction site near where you live. You were treated with steroids and your home medications here at the hospital. A futher workup to determine the cause of your asthma was also done. You will need to take your home medications, as well as some new ones, outside the hospital, and to follow up with Dr. Russell.  INSTRUCTIONS:  1. Please be sure to wear an N95 mask around the construction site to prevent yourself from breathing in particles.  2. Start taking 1200mg of Mucinex once in the morning and once in the evning to help clear up your chest congestion.  3. Continue to take your home Ventolin, Advair, and montelukast as prescribed.  4. Use your Aerobika device to help clear up the congestion; use in the morning and use in the evening.  5. Follow up outpatient with Dr. Russell, a pulmonologist (lung doctor), at your scheduled appointment.      SECONDARY DISCHARGE DIAGNOSES  Diagnosis: Vaginal itching  Assessment and Plan of Treatment: You were found to have vaginal itching while in the hospital. This may have been caused by local irritation or by a common fungal infection.  INSTRUCTIONS:  1. Apply clotrimazole 1% cream to the affected area once a day until the itchiness resolves.

## 2024-06-05 NOTE — DIETITIAN INITIAL EVALUATION ADULT - PERTINENT LABORATORY DATA
06-05    140  |  104  |  19  ----------------------------<  84  4.0   |  27  |  0.77    Ca    10.3      05 Jun 2024 05:30  Phos  4.0     06-05  Mg     1.9     06-05

## 2024-06-05 NOTE — DIETITIAN INITIAL EVALUATION ADULT - PERTINENT MEDS FT
MEDICATIONS  (STANDING):  albuterol/ipratropium for Nebulization 3 milliLiter(s) Nebulizer every 4 hours  atorvastatin 40 milliGRAM(s) Oral at bedtime  budesonide 160 MICROgram(s)/formoterol 4.5 MICROgram(s) Inhaler 2 Puff(s) Inhalation every 12 hours  clotrimazole 1% Vaginal Cream 1 Applicatorful Vaginal every 24 hours  enoxaparin Injectable 40 milliGRAM(s) SubCutaneous every 24 hours  fluticasone propionate 50 MICROgram(s)/spray Nasal Spray 2 Spray(s) Both Nostrils every 12 hours  guaiFENesin ER 1200 milliGRAM(s) Oral every 12 hours  montelukast 10 milliGRAM(s) Oral daily  pantoprazole    Tablet 40 milliGRAM(s) Oral before breakfast  predniSONE   Tablet 40 milliGRAM(s) Oral every 24 hours    MEDICATIONS  (PRN):  acetaminophen     Tablet .. 650 milliGRAM(s) Oral every 6 hours PRN Mild Pain (1 - 3), Moderate Pain (4 - 6)

## 2024-06-06 ENCOUNTER — NON-APPOINTMENT (OUTPATIENT)
Age: 74
End: 2024-06-06

## 2024-06-06 LAB
ACE SERPL-CCNC: 59 U/L — SIGNIFICANT CHANGE UP (ref 14–82)
AUTO DIFF PNL BLD: NEGATIVE — SIGNIFICANT CHANGE UP
C-ANCA SER-ACNC: NEGATIVE — SIGNIFICANT CHANGE UP
CA-I BLD-SCNC: 5.6 MG/DL — SIGNIFICANT CHANGE UP (ref 4.5–5.6)
IGE SERPL-ACNC: 112 KU/L — HIGH
MPO AB + PR3 PNL SER: SIGNIFICANT CHANGE UP
P-ANCA SER-ACNC: NEGATIVE — SIGNIFICANT CHANGE UP

## 2024-06-07 ENCOUNTER — NON-APPOINTMENT (OUTPATIENT)
Age: 74
End: 2024-06-07

## 2024-06-07 LAB — STRONGYLOIDES AB SER-ACNC: NEGATIVE — SIGNIFICANT CHANGE UP

## 2024-06-09 LAB
A FLAVUS AB FLD QL: NEGATIVE — SIGNIFICANT CHANGE UP
A NIGER AB FLD QL: NEGATIVE — SIGNIFICANT CHANGE UP
A NIGER AB FLD QL: NEGATIVE — SIGNIFICANT CHANGE UP

## 2024-06-12 LAB — PTH RELATED PROT SERPL-MCNC: <2 PMOL/L — SIGNIFICANT CHANGE UP

## 2024-06-14 DIAGNOSIS — R73.03 PREDIABETES: ICD-10-CM

## 2024-06-14 DIAGNOSIS — B37.31 ACUTE CANDIDIASIS OF VULVA AND VAGINA: ICD-10-CM

## 2024-06-14 DIAGNOSIS — K21.9 GASTRO-ESOPHAGEAL REFLUX DISEASE WITHOUT ESOPHAGITIS: ICD-10-CM

## 2024-06-14 DIAGNOSIS — J32.9 CHRONIC SINUSITIS, UNSPECIFIED: ICD-10-CM

## 2024-06-14 DIAGNOSIS — E78.5 HYPERLIPIDEMIA, UNSPECIFIED: ICD-10-CM

## 2024-06-14 DIAGNOSIS — E83.52 HYPERCALCEMIA: ICD-10-CM

## 2024-06-14 DIAGNOSIS — J45.901 UNSPECIFIED ASTHMA WITH (ACUTE) EXACERBATION: ICD-10-CM

## 2024-06-14 DIAGNOSIS — D72.10 EOSINOPHILIA, UNSPECIFIED: ICD-10-CM

## 2024-06-20 NOTE — CHART NOTE - NSCHARTNOTEFT_GEN_A_CORE
Patient reports she feels well. SOB and LUO have resolved. Has no other complaints. Reminded patient that she has an upcoming appointment with Dr. Russell on 6/28 which patient confirmed/noted. Patient had no questions or concerns.

## 2024-06-28 ENCOUNTER — APPOINTMENT (OUTPATIENT)
Dept: PULMONOLOGY | Facility: CLINIC | Age: 74
End: 2024-06-28
Payer: MEDICARE

## 2024-06-28 VITALS
OXYGEN SATURATION: 95 % | BODY MASS INDEX: 25.34 KG/M2 | SYSTOLIC BLOOD PRESSURE: 134 MMHG | HEART RATE: 74 BPM | TEMPERATURE: 98.3 F | DIASTOLIC BLOOD PRESSURE: 78 MMHG | HEIGHT: 63 IN | WEIGHT: 143 LBS

## 2024-06-28 DIAGNOSIS — J45.50 SEVERE PERSISTENT ASTHMA, UNCOMPLICATED: ICD-10-CM

## 2024-06-28 DIAGNOSIS — Z23 ENCOUNTER FOR IMMUNIZATION: ICD-10-CM

## 2024-06-28 PROCEDURE — 94010 BREATHING CAPACITY TEST: CPT

## 2024-06-28 PROCEDURE — 99214 OFFICE O/P EST MOD 30 MIN: CPT | Mod: 25

## 2024-07-08 NOTE — PATIENT PROFILE ADULT - NSPROIMPLANTSMEDDEV_GEN_A_NUR
Spoke to pt's daughter, she states occasionally pt takes the extra dose, but she will double check and let us know, otherwise she is ok with Dr. Perkins making any adjustments when he sees her tomorrow.    Breast implant

## 2025-02-01 NOTE — REVIEW OF SYSTEMS
[Constipation] : constipation [Joint Pain] : joint pain [Joint Stiffness] : joint stiffness [Muscle Pain] : muscle pain [Chest Pain] : no chest pain [Palpitations] : no palpitations [Shortness Of Breath] : no shortness of breath [Incontinence] : no incontinence [Skin Rash] : no skin rash [Difficulty Walking] : no difficulty walking [FreeTextEntry3] : no visual changes Statement Selected

## 2025-02-13 ENCOUNTER — NON-APPOINTMENT (OUTPATIENT)
Age: 75
End: 2025-02-13

## 2025-02-13 ENCOUNTER — APPOINTMENT (OUTPATIENT)
Dept: BREAST CENTER | Facility: CLINIC | Age: 75
End: 2025-02-13
Payer: MEDICARE

## 2025-02-13 VITALS
SYSTOLIC BLOOD PRESSURE: 146 MMHG | HEIGHT: 61 IN | HEART RATE: 82 BPM | DIASTOLIC BLOOD PRESSURE: 75 MMHG | BODY MASS INDEX: 26.43 KG/M2 | WEIGHT: 140 LBS

## 2025-02-13 DIAGNOSIS — Z85.3 PERSONAL HISTORY OF MALIGNANT NEOPLASM OF BREAST: ICD-10-CM

## 2025-02-13 DIAGNOSIS — Z90.13 ACQUIRED ABSENCE OF BILATERAL BREASTS AND NIPPLES: ICD-10-CM

## 2025-02-13 PROCEDURE — 99213 OFFICE O/P EST LOW 20 MIN: CPT

## 2025-02-13 RX ORDER — MONTELUKAST 10 MG/1
TABLET, FILM COATED ORAL
Refills: 0 | Status: ACTIVE | COMMUNITY

## 2025-02-26 ENCOUNTER — APPOINTMENT (OUTPATIENT)
Dept: OTOLARYNGOLOGY | Facility: CLINIC | Age: 75
End: 2025-02-26

## 2025-03-12 ENCOUNTER — APPOINTMENT (OUTPATIENT)
Dept: OTOLARYNGOLOGY | Facility: CLINIC | Age: 75
End: 2025-03-12

## 2025-03-12 VITALS
SYSTOLIC BLOOD PRESSURE: 135 MMHG | DIASTOLIC BLOOD PRESSURE: 58 MMHG | HEIGHT: 61 IN | BODY MASS INDEX: 26.62 KG/M2 | TEMPERATURE: 98.5 F | WEIGHT: 141 LBS | HEART RATE: 85 BPM

## 2025-03-12 DIAGNOSIS — R05.3 CHRONIC COUGH: ICD-10-CM

## 2025-03-12 DIAGNOSIS — J30.89 OTHER ALLERGIC RHINITIS: ICD-10-CM

## 2025-03-12 DIAGNOSIS — J01.80 OTHER ACUTE SINUSITIS: ICD-10-CM

## 2025-03-12 PROCEDURE — 99214 OFFICE O/P EST MOD 30 MIN: CPT | Mod: 25

## 2025-03-12 PROCEDURE — 31231 NASAL ENDOSCOPY DX: CPT

## 2025-03-13 PROBLEM — J01.80 OTHER ACUTE SINUSITIS, RECURRENCE NOT SPECIFIED: Status: ACTIVE | Noted: 2025-03-13

## 2025-03-13 PROBLEM — R05.3 PERSISTENT COUGH: Status: ACTIVE | Noted: 2021-01-04

## 2025-03-13 RX ORDER — METHYLPREDNISOLONE 4 MG/1
4 TABLET ORAL
Qty: 1 | Refills: 0 | Status: ACTIVE | COMMUNITY
Start: 2025-03-13 | End: 1900-01-01

## 2025-03-13 RX ORDER — AMOXICILLIN AND CLAVULANATE POTASSIUM 875; 125 MG/1; MG/1
875-125 TABLET, COATED ORAL
Qty: 20 | Refills: 0 | Status: ACTIVE | COMMUNITY
Start: 2025-03-13 | End: 1900-01-01